# Patient Record
Sex: FEMALE | Race: WHITE | Employment: UNEMPLOYED | URBAN - METROPOLITAN AREA
[De-identification: names, ages, dates, MRNs, and addresses within clinical notes are randomized per-mention and may not be internally consistent; named-entity substitution may affect disease eponyms.]

---

## 2021-10-04 ENCOUNTER — APPOINTMENT (OUTPATIENT)
Dept: GENERAL RADIOLOGY | Age: 36
DRG: 137 | End: 2021-10-04
Attending: STUDENT IN AN ORGANIZED HEALTH CARE EDUCATION/TRAINING PROGRAM
Payer: MEDICAID

## 2021-10-04 ENCOUNTER — APPOINTMENT (OUTPATIENT)
Dept: CT IMAGING | Age: 36
DRG: 137 | End: 2021-10-04
Attending: STUDENT IN AN ORGANIZED HEALTH CARE EDUCATION/TRAINING PROGRAM
Payer: MEDICAID

## 2021-10-04 ENCOUNTER — HOSPITAL ENCOUNTER (INPATIENT)
Age: 36
LOS: 2 days | Discharge: HOME | DRG: 137 | End: 2021-10-06
Attending: STUDENT IN AN ORGANIZED HEALTH CARE EDUCATION/TRAINING PROGRAM | Admitting: INTERNAL MEDICINE
Payer: MEDICAID

## 2021-10-04 DIAGNOSIS — U07.1 COVID-19: Primary | ICD-10-CM

## 2021-10-04 DIAGNOSIS — R09.02 HYPOXIA: ICD-10-CM

## 2021-10-04 DIAGNOSIS — D72.819 LEUKOPENIA, UNSPECIFIED TYPE: ICD-10-CM

## 2021-10-04 LAB
ALBUMIN SERPL-MCNC: 3.4 G/DL (ref 3.5–5)
ALBUMIN/GLOB SERPL: 1.1 {RATIO} (ref 1.1–2.2)
ALP SERPL-CCNC: 73 U/L (ref 45–117)
ALT SERPL-CCNC: 45 U/L (ref 12–78)
ANION GAP SERPL CALC-SCNC: 8 MMOL/L (ref 5–15)
AST SERPL W P-5'-P-CCNC: 34 U/L (ref 15–37)
ATRIAL RATE: 123 BPM
BASOPHILS # BLD: 0 K/UL (ref 0–0.1)
BASOPHILS NFR BLD: 0 % (ref 0–1)
BILIRUB SERPL-MCNC: 0.3 MG/DL (ref 0.2–1)
BNP SERPL-MCNC: 31 PG/ML
BUN SERPL-MCNC: 9 MG/DL (ref 6–20)
BUN/CREAT SERPL: 13 (ref 12–20)
CA-I BLD-MCNC: 8.2 MG/DL (ref 8.5–10.1)
CALCULATED P AXIS, ECG09: 51 DEGREES
CALCULATED R AXIS, ECG10: 3 DEGREES
CALCULATED T AXIS, ECG11: -4 DEGREES
CHLORIDE SERPL-SCNC: 106 MMOL/L (ref 97–108)
CO2 SERPL-SCNC: 26 MMOL/L (ref 21–32)
COVID-19 RAPID TEST, COVR: DETECTED
CREAT SERPL-MCNC: 0.71 MG/DL (ref 0.55–1.02)
CRP SERPL-MCNC: 1.92 MG/DL (ref 0–0.6)
DIAGNOSIS, 93000: NORMAL
DIFFERENTIAL METHOD BLD: ABNORMAL
EOSINOPHIL # BLD: 0 K/UL (ref 0–0.4)
EOSINOPHIL NFR BLD: 0 % (ref 0–7)
ERYTHROCYTE [DISTWIDTH] IN BLOOD BY AUTOMATED COUNT: 13.6 % (ref 11.5–14.5)
FERRITIN SERPL-MCNC: 90 NG/ML (ref 8–252)
GLOBULIN SER CALC-MCNC: 3.1 G/DL (ref 2–4)
GLUCOSE SERPL-MCNC: 124 MG/DL (ref 65–100)
HCG SERPL QL: NEGATIVE
HCT VFR BLD AUTO: 38.6 % (ref 35–47)
HGB BLD-MCNC: 12.6 G/DL (ref 11.5–16)
IMM GRANULOCYTES # BLD AUTO: 0 K/UL (ref 0–0.04)
IMM GRANULOCYTES NFR BLD AUTO: 1 % (ref 0–0.5)
LACTATE SERPL-SCNC: 1.2 MMOL/L (ref 0.4–2)
LDH SERPL L TO P-CCNC: 273 U/L (ref 81–246)
LYMPHOCYTES # BLD: 0.6 K/UL (ref 0.8–3.5)
LYMPHOCYTES NFR BLD: 11 % (ref 12–49)
MCH RBC QN AUTO: 28.1 PG (ref 26–34)
MCHC RBC AUTO-ENTMCNC: 32.6 G/DL (ref 30–36.5)
MCV RBC AUTO: 86.2 FL (ref 80–99)
MONOCYTES # BLD: 0.3 K/UL (ref 0–1)
MONOCYTES NFR BLD: 5 % (ref 5–13)
NEUTS SEG # BLD: 4.9 K/UL (ref 1.8–8)
NEUTS SEG NFR BLD: 83 % (ref 32–75)
NRBC # BLD: 0 K/UL (ref 0–0.01)
NRBC BLD-RTO: 0 PER 100 WBC
P-R INTERVAL, ECG05: 132 MS
PLATELET # BLD AUTO: 294 K/UL (ref 150–400)
PMV BLD AUTO: 9.5 FL (ref 8.9–12.9)
POTASSIUM SERPL-SCNC: 3.6 MMOL/L (ref 3.5–5.1)
PROCALCITONIN SERPL-MCNC: <0.05 NG/ML
PROT SERPL-MCNC: 6.5 G/DL (ref 6.4–8.2)
Q-T INTERVAL, ECG07: 322 MS
QRS DURATION, ECG06: 76 MS
QTC CALCULATION (BEZET), ECG08: 460 MS
RBC # BLD AUTO: 4.48 M/UL (ref 3.8–5.2)
SODIUM SERPL-SCNC: 140 MMOL/L (ref 136–145)
SPECIMEN SOURCE: ABNORMAL
TROPONIN I SERPL-MCNC: <0.05 NG/ML
VENTRICULAR RATE, ECG03: 123 BPM
WBC # BLD AUTO: 5.8 K/UL (ref 3.6–11)

## 2021-10-04 PROCEDURE — 93005 ELECTROCARDIOGRAM TRACING: CPT

## 2021-10-04 PROCEDURE — 65270000029 HC RM PRIVATE

## 2021-10-04 PROCEDURE — 96374 THER/PROPH/DIAG INJ IV PUSH: CPT

## 2021-10-04 PROCEDURE — 71045 X-RAY EXAM CHEST 1 VIEW: CPT

## 2021-10-04 PROCEDURE — 74011000258 HC RX REV CODE- 258: Performed by: HOSPITALIST

## 2021-10-04 PROCEDURE — 85025 COMPLETE CBC W/AUTO DIFF WBC: CPT

## 2021-10-04 PROCEDURE — 74011000636 HC RX REV CODE- 636: Performed by: HOSPITALIST

## 2021-10-04 PROCEDURE — 83880 ASSAY OF NATRIURETIC PEPTIDE: CPT

## 2021-10-04 PROCEDURE — 96372 THER/PROPH/DIAG INJ SC/IM: CPT

## 2021-10-04 PROCEDURE — 99285 EMERGENCY DEPT VISIT HI MDM: CPT

## 2021-10-04 PROCEDURE — 71275 CT ANGIOGRAPHY CHEST: CPT

## 2021-10-04 PROCEDURE — 74011250637 HC RX REV CODE- 250/637: Performed by: INTERNAL MEDICINE

## 2021-10-04 PROCEDURE — 84703 CHORIONIC GONADOTROPIN ASSAY: CPT

## 2021-10-04 PROCEDURE — 84484 ASSAY OF TROPONIN QUANT: CPT

## 2021-10-04 PROCEDURE — XW0DXM6 INTRODUCTION OF BARICITINIB INTO MOUTH AND PHARYNX, EXTERNAL APPROACH, NEW TECHNOLOGY GROUP 6: ICD-10-PCS | Performed by: INTERNAL MEDICINE

## 2021-10-04 PROCEDURE — 99222 1ST HOSP IP/OBS MODERATE 55: CPT | Performed by: INTERNAL MEDICINE

## 2021-10-04 PROCEDURE — 74011250636 HC RX REV CODE- 250/636: Performed by: STUDENT IN AN ORGANIZED HEALTH CARE EDUCATION/TRAINING PROGRAM

## 2021-10-04 PROCEDURE — 74011000250 HC RX REV CODE- 250: Performed by: HOSPITALIST

## 2021-10-04 PROCEDURE — 83605 ASSAY OF LACTIC ACID: CPT

## 2021-10-04 PROCEDURE — 96375 TX/PRO/DX INJ NEW DRUG ADDON: CPT

## 2021-10-04 PROCEDURE — 82728 ASSAY OF FERRITIN: CPT

## 2021-10-04 PROCEDURE — 86140 C-REACTIVE PROTEIN: CPT

## 2021-10-04 PROCEDURE — 36415 COLL VENOUS BLD VENIPUNCTURE: CPT

## 2021-10-04 PROCEDURE — 74011250636 HC RX REV CODE- 250/636: Performed by: HOSPITALIST

## 2021-10-04 PROCEDURE — 83615 LACTATE (LD) (LDH) ENZYME: CPT

## 2021-10-04 PROCEDURE — 74011250637 HC RX REV CODE- 250/637: Performed by: HOSPITALIST

## 2021-10-04 PROCEDURE — 87635 SARS-COV-2 COVID-19 AMP PRB: CPT

## 2021-10-04 PROCEDURE — 84145 PROCALCITONIN (PCT): CPT

## 2021-10-04 PROCEDURE — 80053 COMPREHEN METABOLIC PANEL: CPT

## 2021-10-04 PROCEDURE — 74011250636 HC RX REV CODE- 250/636: Performed by: INTERNAL MEDICINE

## 2021-10-04 RX ORDER — LORAZEPAM 0.5 MG/1
0.5 TABLET ORAL
Status: CANCELLED | OUTPATIENT
Start: 2021-10-04

## 2021-10-04 RX ORDER — ONDANSETRON 4 MG/1
4 TABLET, ORALLY DISINTEGRATING ORAL
Status: DISCONTINUED | OUTPATIENT
Start: 2021-10-04 | End: 2021-10-06 | Stop reason: HOSPADM

## 2021-10-04 RX ORDER — DEXAMETHASONE SODIUM PHOSPHATE 4 MG/ML
6 INJECTION, SOLUTION INTRA-ARTICULAR; INTRALESIONAL; INTRAMUSCULAR; INTRAVENOUS; SOFT TISSUE EVERY 24 HOURS
Status: DISCONTINUED | OUTPATIENT
Start: 2021-10-04 | End: 2021-10-04

## 2021-10-04 RX ORDER — LORAZEPAM 0.5 MG/1
0.5 TABLET ORAL
COMMUNITY

## 2021-10-04 RX ORDER — ENOXAPARIN SODIUM 100 MG/ML
40 INJECTION SUBCUTANEOUS DAILY
Status: DISCONTINUED | OUTPATIENT
Start: 2021-10-04 | End: 2021-10-04

## 2021-10-04 RX ORDER — ENOXAPARIN SODIUM 100 MG/ML
30 INJECTION SUBCUTANEOUS EVERY 12 HOURS
Status: DISCONTINUED | OUTPATIENT
Start: 2021-10-04 | End: 2021-10-06 | Stop reason: HOSPADM

## 2021-10-04 RX ORDER — MAG HYDROX/ALUMINUM HYD/SIMETH 200-200-20
45 SUSPENSION, ORAL (FINAL DOSE FORM) ORAL
Status: COMPLETED | OUTPATIENT
Start: 2021-10-04 | End: 2021-10-04

## 2021-10-04 RX ORDER — MELATONIN
1000 DAILY
Status: DISCONTINUED | OUTPATIENT
Start: 2021-10-04 | End: 2021-10-06 | Stop reason: HOSPADM

## 2021-10-04 RX ORDER — SODIUM CHLORIDE 0.9 % (FLUSH) 0.9 %
5-40 SYRINGE (ML) INJECTION EVERY 8 HOURS
Status: DISCONTINUED | OUTPATIENT
Start: 2021-10-04 | End: 2021-10-06 | Stop reason: HOSPADM

## 2021-10-04 RX ORDER — DOXYCYCLINE 100 MG/1
100 CAPSULE ORAL 2 TIMES DAILY
COMMUNITY
Start: 2021-10-03 | End: 2021-10-06

## 2021-10-04 RX ORDER — IPRATROPIUM BROMIDE AND ALBUTEROL SULFATE 2.5; .5 MG/3ML; MG/3ML
3 SOLUTION RESPIRATORY (INHALATION)
Status: DISCONTINUED | OUTPATIENT
Start: 2021-10-04 | End: 2021-10-06 | Stop reason: HOSPADM

## 2021-10-04 RX ORDER — ONDANSETRON 2 MG/ML
4 INJECTION INTRAMUSCULAR; INTRAVENOUS
Status: DISCONTINUED | OUTPATIENT
Start: 2021-10-04 | End: 2021-10-06 | Stop reason: HOSPADM

## 2021-10-04 RX ORDER — PANTOPRAZOLE SODIUM 40 MG/1
40 TABLET, DELAYED RELEASE ORAL DAILY
Status: DISCONTINUED | OUTPATIENT
Start: 2021-10-04 | End: 2021-10-06 | Stop reason: HOSPADM

## 2021-10-04 RX ORDER — ACETAMINOPHEN 325 MG/1
650 TABLET ORAL
Status: DISCONTINUED | OUTPATIENT
Start: 2021-10-04 | End: 2021-10-06 | Stop reason: HOSPADM

## 2021-10-04 RX ORDER — CHOLECALCIFEROL (VITAMIN D3) 125 MCG
5 CAPSULE ORAL
Status: DISCONTINUED | OUTPATIENT
Start: 2021-10-04 | End: 2021-10-06 | Stop reason: HOSPADM

## 2021-10-04 RX ORDER — ZINC SULFATE 50(220)MG
1 CAPSULE ORAL DAILY
Status: DISCONTINUED | OUTPATIENT
Start: 2021-10-04 | End: 2021-10-06 | Stop reason: HOSPADM

## 2021-10-04 RX ORDER — DEXAMETHASONE SODIUM PHOSPHATE 4 MG/ML
6 INJECTION, SOLUTION INTRA-ARTICULAR; INTRALESIONAL; INTRAMUSCULAR; INTRAVENOUS; SOFT TISSUE EVERY 12 HOURS
Status: DISCONTINUED | OUTPATIENT
Start: 2021-10-04 | End: 2021-10-06 | Stop reason: HOSPADM

## 2021-10-04 RX ORDER — POLYETHYLENE GLYCOL 3350 17 G/17G
17 POWDER, FOR SOLUTION ORAL DAILY PRN
Status: DISCONTINUED | OUTPATIENT
Start: 2021-10-04 | End: 2021-10-06 | Stop reason: HOSPADM

## 2021-10-04 RX ORDER — ACETAMINOPHEN 650 MG/1
650 SUPPOSITORY RECTAL
Status: DISCONTINUED | OUTPATIENT
Start: 2021-10-04 | End: 2021-10-06 | Stop reason: HOSPADM

## 2021-10-04 RX ORDER — SODIUM CHLORIDE 0.9 % (FLUSH) 0.9 %
5-40 SYRINGE (ML) INJECTION AS NEEDED
Status: DISCONTINUED | OUTPATIENT
Start: 2021-10-04 | End: 2021-10-06 | Stop reason: HOSPADM

## 2021-10-04 RX ORDER — BENZONATATE 100 MG/1
100 CAPSULE ORAL
Status: DISCONTINUED | OUTPATIENT
Start: 2021-10-04 | End: 2021-10-06 | Stop reason: HOSPADM

## 2021-10-04 RX ORDER — BENZONATATE 100 MG/1
1 CAPSULE ORAL
COMMUNITY
Start: 2021-10-03

## 2021-10-04 RX ORDER — LORAZEPAM 0.5 MG/1
0.5 TABLET ORAL
Status: DISCONTINUED | OUTPATIENT
Start: 2021-10-04 | End: 2021-10-06 | Stop reason: HOSPADM

## 2021-10-04 RX ADMIN — DEXAMETHASONE SODIUM PHOSPHATE 6 MG: 4 INJECTION, SOLUTION INTRAMUSCULAR; INTRAVENOUS at 20:21

## 2021-10-04 RX ADMIN — ENOXAPARIN SODIUM 30 MG: 40 INJECTION SUBCUTANEOUS at 20:22

## 2021-10-04 RX ADMIN — ACETAMINOPHEN 650 MG: 325 TABLET ORAL at 20:21

## 2021-10-04 RX ADMIN — ZINC SULFATE 220 MG (50 MG) CAPSULE 1 CAPSULE: CAPSULE at 08:35

## 2021-10-04 RX ADMIN — IOPAMIDOL 100 ML: 755 INJECTION, SOLUTION INTRAVENOUS at 06:59

## 2021-10-04 RX ADMIN — LORAZEPAM 0.5 MG: 0.5 TABLET ORAL at 11:03

## 2021-10-04 RX ADMIN — BENZONATATE 100 MG: 100 CAPSULE ORAL at 20:21

## 2021-10-04 RX ADMIN — SODIUM CHLORIDE 1000 ML: 9 INJECTION, SOLUTION INTRAVENOUS at 05:41

## 2021-10-04 RX ADMIN — LORAZEPAM 0.5 MG: 0.5 TABLET ORAL at 23:17

## 2021-10-04 RX ADMIN — ENOXAPARIN SODIUM 40 MG: 40 INJECTION SUBCUTANEOUS at 08:36

## 2021-10-04 RX ADMIN — DEXAMETHASONE SODIUM PHOSPHATE 6 MG: 4 INJECTION, SOLUTION INTRA-ARTICULAR; INTRALESIONAL; INTRAMUSCULAR; INTRAVENOUS; SOFT TISSUE at 08:36

## 2021-10-04 RX ADMIN — DOXYCYCLINE 100 MG: 100 INJECTION, POWDER, LYOPHILIZED, FOR SOLUTION INTRAVENOUS at 08:36

## 2021-10-04 RX ADMIN — CEFTRIAXONE 1 G: 1 INJECTION, POWDER, FOR SOLUTION INTRAMUSCULAR; INTRAVENOUS at 08:35

## 2021-10-04 RX ADMIN — Medication 1000 UNITS: at 08:35

## 2021-10-04 RX ADMIN — Medication 10 ML: at 22:46

## 2021-10-04 RX ADMIN — PANTOPRAZOLE SODIUM 40 MG: 40 TABLET, DELAYED RELEASE ORAL at 15:40

## 2021-10-04 RX ADMIN — ALUMINUM HYDROXIDE, MAGNESIUM HYDROXIDE, AND SIMETHICONE 45 ML: 200; 200; 20 SUSPENSION ORAL at 13:40

## 2021-10-04 NOTE — ED NOTES
TRANSFER - OUT REPORT:    Verbal report given to Onofre(name) on Jamie Barnard  being transferred to (unit) for routine progression of care       Report consisted of patients Situation, Background, Assessment and   Recommendations(SBAR). Information from the following report(s) SBAR and ED Summary was reviewed with the receiving nurse. Lines:   Peripheral IV 10/04/21 Left;Posterior Forearm (Active)        Opportunity for questions and clarification was provided.       Patient transported with:   CH Mack

## 2021-10-04 NOTE — ED PROVIDER NOTES
EMERGENCY DEPARTMENT HISTORY AND PHYSICAL EXAM      Date: 10/4/2021  Patient Name: Rashawn Faith    History of Presenting Illness     Chief Complaint   Patient presents with    Chest Pain     covid +       HPI: Rashawn Faith, 30 y.o. female with no past medical history presenting for shortness of breath. The patient was diagnosed with COVID-19 11 days ago and has been on 2 days of doxycycline and 2 days of methylprednisone p.o. Yesterday she got the infusion of monoclonal antibodies. Today, she is presenting with worsening shortness of breath and chest tightness. She was hypoxic and was placed on 4 L of oxygen. She denies any chest pain. Denies any abdominal pain, nausea, or vomiting. No diarrhea. No runny nose or sore throat. No lightheadedness, dizziness, or syncope.       PCP: Unknown, Provider, MD    Current Facility-Administered Medications   Medication Dose Route Frequency Provider Last Rate Last Admin    sodium chloride 0.9 % bolus infusion 1,000 mL  1,000 mL IntraVENous Naman Sun MD 1,000 mL/hr at 10/04/21 0541 1,000 mL at 10/04/21 0541    sodium chloride (NS) flush 5-40 mL  5-40 mL IntraVENous Q8H Ellis Rachel MD        sodium chloride (NS) flush 5-40 mL  5-40 mL IntraVENous PRN Mateo Dooley MD        acetaminophen (TYLENOL) tablet 650 mg  650 mg Oral Q6H PRN Mateo Dooley MD        Or   Creston Sicard acetaminophen (TYLENOL) suppository 650 mg  650 mg Rectal Q6H PRN Mateo Dooley MD        polyethylene glycol (MIRALAX) packet 17 g  17 g Oral DAILY PRN Mateo Dooley MD        ondansetron (ZOFRAN ODT) tablet 4 mg  4 mg Oral Q8H PRN Mateo Dooley MD        Or    ondansetron [facility]) injection 4 mg  4 mg IntraVENous Q6H PRN Mateo Dooley MD        enoxaparin (LOVENOX) injection 40 mg  40 mg SubCUTAneous DAILY Mateo Dooley MD        dexamethasone (DECADRON) 4 mg/mL injection 6 mg  6 mg IntraVENous Q24H Mateo Dooley MD           Medical History   I reviewed the medical, surgical, family, and social history, as well as allergies:    Past Medical History:  Past Medical History:   Diagnosis Date    Hypertension     Other Ill-Defined Conditions     palpitations       Past Surgical History:  No past surgical history on file. Family History:  No family history on file. Social History:  Social History     Tobacco Use    Smoking status: Former Smoker   Substance Use Topics    Alcohol use: No    Drug use: No       Allergies: Allergies   Allergen Reactions    Codeine Nausea Only and Palpitations    Demerol [Meperidine] Itching    Zithromax [Azithromycin] Unknown (comments)       Review of Systems     Review of Systems   Constitutional: Positive for chills, fatigue and fever. HENT: Negative for congestion, rhinorrhea and sore throat. Eyes: Negative. Respiratory: Positive for cough and shortness of breath. Cardiovascular: Negative for chest pain and leg swelling. Gastrointestinal: Negative for abdominal pain and vomiting. Endocrine: Negative. Genitourinary: Negative for dysuria and hematuria. Musculoskeletal: Negative for back pain and myalgias. Skin: Negative for rash and wound. Allergic/Immunologic: Negative. Neurological: Negative for light-headedness and numbness. Hematological: Negative. Psychiatric/Behavioral: Negative for agitation and confusion. Physical Exam and Vital Signs   Vital Signs - Reviewed the patient's vital signs.     Patient Vitals for the past 12 hrs:   Temp Pulse Resp BP SpO2   10/04/21 0548 -- -- -- -- 94 %   10/04/21 0454 99.9 °F (37.7 °C) (!) 121 15 110/74 96 %       Physical Exam:    GENERAL: awake, alert, cooperative, not in distress  HEENT:  * Pupils equal, EOMI  * Head atraumatic  CV:  * regular rhythm  * warm and perfused extremities bilaterally  PULMONARY: Good air movement, no wheezes or crackles  ABDOMEN: soft, not distended, no guarding, not tenderness to palpation  : No suprapubic tenderness  EXTREMITIES/BACK: warm and perfused, no tenderness, no edema  SKIN: no rashes or signs of trauma  NEURO:  * Speech clear  * Moves U&LE to command      Medical Decision Making and ED Course   - I am the first and primary provider for this patient and am the primary provider of record. - I reviewed the vital signs, available nursing notes, past medical history, past surgical history, family history and social history. - Initial assessment performed. The patients presenting problems have been discussed, and the staff are in agreement with the care plan formulated and outlined with them. I have encouraged them to ask questions as they arise throughout their visit. - Available medical records, nursing notes, old EKGs, and EMS run sheets (if patient was EMS transported) were reviewed    MDM:   Patient is a 30 y.o. female presenting for COVID-19 like symptoms. Vitals reveal hypoxia and tachycardia to 121 and physical exam reveals no abnormalities. Based on the history, physical exam, risk factors, and vitals signs, differential includes: URI, COVID19, pneumonia, pneumothorax, postnasal drip, dehydration, electrolyte disturbances, DEMETRIA, PE. Results     Labs:     Recent Results (from the past 12 hour(s))   CBC WITH AUTOMATED DIFF    Collection Time: 10/04/21  5:35 AM   Result Value Ref Range    WBC 5.8 3.6 - 11.0 K/uL    RBC 4.48 3.80 - 5.20 M/uL    HGB 12.6 11.5 - 16.0 g/dL    HCT 38.6 35.0 - 47.0 %    MCV 86.2 80.0 - 99.0 FL    MCH 28.1 26.0 - 34.0 PG    MCHC 32.6 30.0 - 36.5 g/dL    RDW 13.6 11.5 - 14.5 %    PLATELET 429 264 - 412 K/uL    MPV 9.5 8.9 - 12.9 FL    NRBC 0.0 0.0  WBC    ABSOLUTE NRBC 0.00 0.00 - 0.01 K/uL    NEUTROPHILS 83 (H) 32 - 75 %    LYMPHOCYTES 11 (L) 12 - 49 %    MONOCYTES 5 5 - 13 %    EOSINOPHILS 0 0 - 7 %    BASOPHILS 0 0 - 1 %    IMMATURE GRANULOCYTES 1 (H) 0 - 0.5 %    ABS. NEUTROPHILS 4.9 1.8 - 8.0 K/UL    ABS. LYMPHOCYTES 0.6 (L) 0.8 - 3.5 K/UL    ABS.  MONOCYTES 0.3 0.0 - 1.0 K/UL    ABS. EOSINOPHILS 0.0 0.0 - 0.4 K/UL    ABS. BASOPHILS 0.0 0.0 - 0.1 K/UL    ABS. IMM. GRANS. 0.0 0.00 - 0.04 K/UL    DF AUTOMATED         Radiologic Studies:  CT Results  (Last 48 hours)    None        CXR Results  (Last 48 hours)    None          Medications ordered:  Medications   sodium chloride 0.9 % bolus infusion 1,000 mL (1,000 mL IntraVENous New Bag 10/4/21 0541)   sodium chloride (NS) flush 5-40 mL (has no administration in time range)   sodium chloride (NS) flush 5-40 mL (has no administration in time range)   acetaminophen (TYLENOL) tablet 650 mg (has no administration in time range)     Or   acetaminophen (TYLENOL) suppository 650 mg (has no administration in time range)   polyethylene glycol (MIRALAX) packet 17 g (has no administration in time range)   ondansetron (ZOFRAN ODT) tablet 4 mg (has no administration in time range)     Or   ondansetron (ZOFRAN) injection 4 mg (has no administration in time range)   enoxaparin (LOVENOX) injection 40 mg (has no administration in time range)   dexamethasone (DECADRON) 4 mg/mL injection 6 mg (has no administration in time range)        ED Course     ED Course:          Reassessment / Disposition / Discussion:    Patient stable. No acute complaints. Will admit. Final Disposition     Disposition: Condition stable  Admitted to Floor Medical Floor the case was discussed with the admitting physician Dr. Jackeline Brothers. ADMISSION: After completion of ED workup and discussion of results and diagnoses with the patient, patient was admitted to the hospital. All the patient's questions were answered. Case was discussed with the receiving team.    ED Procedures & Consultations   Performed by: Kelsie Morgan MD  Procedures     EKG interpretation (Preliminary):  Rhythm: normal sinus rhythm; and tachycardic . Rate (approx.): 123.   Axis: normal;  SD interval: normal;  QRS interval: normal ;  ST/T wave: normal;  Other findings: abnormal EKG: Sinus tachycardia. ED Critical Care   and Critical Care  CRITICAL CARE NOTE :  5:36 AM    Critical care time is being documented due to the fulfillment of at least one of the following:    - Critical conditions: condition that acutely impairs one or more vital organ systems such that there is a high probability of imminent or life-threatening deterioration in condition. Examples are diagnoses including but not limited to Afib RVR, DKA, PE, Etc. .    - Critical interventions: an action whose failure to initiate would likely allow a sudden, clinically significant decline in the patient's condition. These include   Requirement of transfer or ICU admission   Contemplation or provision of tPA   Drip initiation (pressors, antiarrhythmics, heparin, etc.)   Antidotes given (narcan, charcoal, epi for anaphylaxis, etc..)   >=2L fluid bolus   >=3 Duonebs   >1 IV/IM doses of sedatives, antiepileptics, BP meds, rate control meds, adenosine.  Procedures that are suggestive of critical care: chest tubes, cardioversion, BiPAP, IO, etc..    Critical care time is documented based on continuous or non-continuous provision of care that includes face-to-face time, placing orders, chart review, documentation, discussion with consultants, discussion with family. This time calculation is a best approximation and does not include time spent on CPR, EKG interpretation, central line placement, intubation, laceration repairs, and other separately billed procedures. Amount of Critical Care Time: 35minutes    Details of critical care provision is documented above.  A general summary is listed below:    IMPENDING DETERIORATION -Respiratory  ASSOCIATED RISK FACTORS - Hypoxia  MANAGEMENT- Bedside Assessment  INTERPRETATION -  Xrays and ECG  INTERVENTIONS - hemodynamic mngmt  CASE REVIEW - Hospitalist/Intensivist  TREATMENT RESPONSE -Stable  PERFORMED BY - Self    NOTES   :  During this entire length of time I was immediately available to the patient . Gerard Thomas MD    Diagnosis     Clinical Impression:   1. COVID-19    2. Hypoxia        Attestations:    Gerard Thomas MD    Please note that this dictation was completed with eDealya, the computer voice recognition software. Quite often unanticipated grammatical, syntax, homophones, and other interpretive errors are inadvertently transcribed by the computer software. Please disregard these errors. Please excuse any errors that have escaped final proofreading. Thank you.

## 2021-10-04 NOTE — CONSULTS
Infectious Disease Consult Note    Reason for Consult:  COVID PNA   Date of Consultation: October 4, 2021  Date of Admission: 10/4/2021  Referring Physician: Hospitalist     HPI: 35-y.o WF, who presented w a 1 day h/o dyspnea, fever, and chest pain after testing positive for COVID-19 11 days ago. She noted worsening dyspnea couple days ago, was seen at an outside hospital ED DR QUE GARRETT [facility]), given an infusion of monoclonal ab (Regeneron), doxycycline and steroids prior to d/c. She notes worsening dyspnea, non-productive cough subjective fever/chills after her visit to the ED which prompted her current  presentation. She had a temp of  99.9F earlier today is otherwise tachycardic, and maintaining O2 sats on 2 L. CTA chest done earlier today was negative for PE, revealed multifocal infiltrates consistent with COVID PNA. Amos Contreras Pt is currently on Decadron IV every 12 hours and baricitinib. She reported feeling better during my assessment. Her medical history is sig for obesity and HTN. Review of Systems:     Gen: Negative for chills, fevers, weight loss, weight gain   CV:  Negative for chest pain, dyspnea on exertion, leg edema   Lungs: shortness of breath, cough, wheezing   Abdomen: Negative for abdominal pain, nausea, vomiting, diarrhea, constipation   Genitourinary: Negative for genital pain or genital discharge     Neuro: Negative for headache, numbness, tingling, extremity weakness   Skin: Negative for rash, sores/open wounds   Musculoskeletal: Negative for joint pain, joint swelling, joint erythema    Psych: Negative for manic behavior       Past Medical History:  Past Medical History:   Diagnosis Date    Hypertension     Other Ill-Defined Conditions     palpitations       Past surgical history   No past surgical history on file.      Social History   Social History     Tobacco Use    Smoking status: Former Smoker   Substance Use Topics    Alcohol use: No    Drug use: No        Family history   No family history on file. Allergies: Allergies   Allergen Reactions    Codeine Nausea Only and Palpitations    Demerol [Meperidine] Itching    Zithromax [Azithromycin] Unknown (comments)     Patient said it makes \"her skin on fire\" per Aris Menchaca RN         Medications:  No current facility-administered medications on file prior to encounter. Current Outpatient Medications on File Prior to Encounter   Medication Sig Dispense Refill    LORazepam (ATIVAN) 0.5 mg tablet Take 0.5 mg by mouth two (2) times daily as needed. Physical Exam:    Vitals:   Patient Vitals for the past 24 hrs:   Temp Pulse Resp BP SpO2   10/04/21 1104 -- -- -- -- 97 %   10/04/21 1034 -- (!) 101 16 139/84 97 %   10/04/21 1033 -- -- -- -- 96 %   10/04/21 1000 -- (!) 102 16 -- --   10/04/21 0930 -- 99 15 139/84 97 %   10/04/21 0800 -- 87 22 (!) 144/79 96 %   10/04/21 0548 -- -- -- -- 94 %   10/04/21 0454 99.9 °F (37.7 °C) (!) 121 15 110/74 96 %   ·   · GEN: NAD, AAO x 4  · HEENT: NCAT, PERRLA  · HEART: S1, S2+, RRR, No murmur   · Lungs: Decreased at the bases, no wheeze/rhonchi   · Abdomen: soft, ND, NT, +BS   · Genitourinary: no genital discharge, no lange  · Extremities: no edema  · Skin: no rash    Labs:   Recent Labs     10/04/21  0535   WBC 5.8   HGB 12.6   HCT 38.6        Recent Labs     10/04/21  0535   BUN 9   CREA 0.71       Lab Results   Component Value Date/Time    C-Reactive protein 1.92 (H) 10/04/2021 11:20 AM        Microbiology Data: None      Imaging:   CTA chest 10/04: No pulmonary embolism definitively identified. Multifocal airspace  opacities in the lungs compatible with Covid-19 pneumonia. Other findings as  above. Assessment / Plan:     1.   Covid pneumonitis, moderate disease, symptoms commenced 11 days ago        S/p monoclonal Ab infusion 2 days ago (Regeneron)        Dyspneic on presentation, nonproductive cough, maintain O2 sats on 2 L       CT chest negative for PE, revealed b/l multifocal airspace disease        Afebrile, normal WBC, , CRP 1.92, Ferritin is pending        D/c Baricitinib, since pt is s/p Regeneron which has a long half life, missed window for Remdesivir        Continue supportive care and decadron. Currently no indication for antimicrobial therapy     2. Non -productive cough: due to # 1, symptomatic mgt     3. Obesity: Likely complicating # 1    4.  H/o HTN: mgt per primary       Peggy Larson MD     10/4/2021

## 2021-10-04 NOTE — H&P
History and Physical    Subjective:   Chief Complaint : chest pain and shortness of breath since 1 day, worsening  Source of information : patient     History of present illness:     35F, no PMH, with worsening shortness of breath and chest pain since 1 day    Diagnosed with COVID 11 days back, strated as cough and sorethorat with bodyaches, she was treated with symptomatically, 2 days back she developed worsening shortness of breath and was not able to walk without getting short of breath     She went to peripheral hospital received doxycycline, soulmedrol and monoclonal antibiodies    Her symptoms continued to worsen and presented here    Her CT chest and XR is pending as well as BMP    She probably got it from brother-in law who has covid as well, and eventually all her as well    ER: 4L NC    No PMH,     Past Medical History:   Diagnosis Date    Hypertension     Other Ill-Defined Conditions     palpitations     No past surgical history on file. No family history on file. Social History     Tobacco Use    Smoking status: Former Smoker   Substance Use Topics    Alcohol use: No       Prior to Admission medications    Not on File     Allergies   Allergen Reactions    Codeine Nausea Only and Palpitations    Demerol [Meperidine] Itching    Zithromax [Azithromycin] Unknown (comments)             Review of Systems:  Review of Systems   Constitutional: Positive for chills, fatigue and fever. HENT: Negative for congestion, rhinorrhea and sore throat. Eyes: Negative. Respiratory: Positive for cough and shortness of breath. Cardiovascular: Negative for chest pain and leg swelling. Gastrointestinal: Negative for abdominal pain and vomiting. Endocrine: Negative. Genitourinary: Negative for dysuria and hematuria. Musculoskeletal: Negative for back pain and myalgias. Skin: Negative for rash and wound. Allergic/Immunologic: Negative.     Neurological: Negative for light-headedness and numbness. Hematological: Negative. Psychiatric/Behavioral: Negative for agitation and confusion. Vitals:     Visit Vitals  /74   Pulse (!) 121   Temp 99.9 °F (37.7 °C)   Resp 15   Ht 5' 8\" (1.727 m)   Wt 113.4 kg (250 lb)   SpO2 94%   BMI 38.01 kg/m²       Physical Exam:   GENERAL: awake, alert, cooperative, not in distress  HEENT:  * Pupils equal, EOMI  * Head atraumatic  CV:  * regular rhythm  * warm and perfused extremities bilaterally  PULMONARY: Good air movement, no wheezes or crackles  ABDOMEN: soft, not distended, no guarding, not tenderness to palpation  : No suprapubic tenderness  EXTREMITIES/BACK: warm and perfused, no tenderness, no edema  SKIN: no rashes or signs of trauma  NEURO:  * Speech clear  * Moves U&LE to command         Data Review:   Recent Results (from the past 24 hour(s))   CBC WITH AUTOMATED DIFF    Collection Time: 10/04/21  5:35 AM   Result Value Ref Range    WBC 5.8 3.6 - 11.0 K/uL    RBC 4.48 3.80 - 5.20 M/uL    HGB 12.6 11.5 - 16.0 g/dL    HCT 38.6 35.0 - 47.0 %    MCV 86.2 80.0 - 99.0 FL    MCH 28.1 26.0 - 34.0 PG    MCHC 32.6 30.0 - 36.5 g/dL    RDW 13.6 11.5 - 14.5 %    PLATELET 075 934 - 405 K/uL    MPV 9.5 8.9 - 12.9 FL    NRBC 0.0 0.0  WBC    ABSOLUTE NRBC 0.00 0.00 - 0.01 K/uL    NEUTROPHILS 83 (H) 32 - 75 %    LYMPHOCYTES 11 (L) 12 - 49 %    MONOCYTES 5 5 - 13 %    EOSINOPHILS 0 0 - 7 %    BASOPHILS 0 0 - 1 %    IMMATURE GRANULOCYTES 1 (H) 0 - 0.5 %    ABS. NEUTROPHILS 4.9 1.8 - 8.0 K/UL    ABS. LYMPHOCYTES 0.6 (L) 0.8 - 3.5 K/UL    ABS. MONOCYTES 0.3 0.0 - 1.0 K/UL    ABS. EOSINOPHILS 0.0 0.0 - 0.4 K/UL    ABS. BASOPHILS 0.0 0.0 - 0.1 K/UL    ABS. IMM.  GRANS. 0.0 0.00 - 0.04 K/UL    DF AUTOMATED               Assessment and Plan :     (1) Acute hypoxic respiratory failure : 4L NC, wean as tolerated to keep saturation > 92    (2) covid-19 pneumonia: decadron, azithromycin, ceftriaone, consult ID    (3) morbid obesity: complicates all aspects of care    DVT ppx: lovenox     DISPO: pending ct chest, XR, BMP.  Once on room air, 3-4 days    Signed By: Natalie Barrera MD     October 4, 2021

## 2021-10-04 NOTE — PROGRESS NOTES
Hospitalist Progress Note               Daily Progress Note: 10/4/2021      Subjective:   Hospital course to date: Patient is a 71-year-old female without past medical history who was began with symptoms of cough and shortness of breath 11 days ago, diagnosed with COVID-19 7 days ago. She was doing well at home until the past 2 days when she developed worsening shortness of breath. Patient was recently seen at another facility, received doxycycline, steroids and treatment with monoclonal antibodies yesterday at a different hospital.  Despite that, her condition still worsened. In the ED she was hypoxic and required 4 L nasal cannula. Heart rate was 120. CT of the chest was compatible with COVID-19    She was admitted and started on ceftriaxone and doxycycline    ----    Patient seen today for follow-up. She continues on 4 L nasal cannula.   Procalcitonin is negative    Problem List:      Medications reviewed  Current Facility-Administered Medications   Medication Dose Route Frequency    sodium chloride (NS) flush 5-40 mL  5-40 mL IntraVENous Q8H    sodium chloride (NS) flush 5-40 mL  5-40 mL IntraVENous PRN    acetaminophen (TYLENOL) tablet 650 mg  650 mg Oral Q6H PRN    Or    acetaminophen (TYLENOL) suppository 650 mg  650 mg Rectal Q6H PRN    polyethylene glycol (MIRALAX) packet 17 g  17 g Oral DAILY PRN    ondansetron (ZOFRAN ODT) tablet 4 mg  4 mg Oral Q8H PRN    Or    ondansetron (ZOFRAN) injection 4 mg  4 mg IntraVENous Q6H PRN    enoxaparin (LOVENOX) injection 40 mg  40 mg SubCUTAneous DAILY    dexamethasone (DECADRON) 4 mg/mL injection 6 mg  6 mg IntraVENous Q24H    cefTRIAXone (ROCEPHIN) 1 g in sterile water (preservative free) 10 mL IV syringe  1 g IntraVENous Q24H    zinc sulfate (ZINCATE) 50 mg zinc (220 mg) capsule 1 Capsule  1 Capsule Oral DAILY    cholecalciferol (VITAMIN D3) (1000 Units /25 mcg) tablet 1,000 Units  1,000 Units Oral DAILY    benzonatate (TESSALON) capsule 100 mg  100 mg Oral TID PRN    albuterol-ipratropium (DUO-NEB) 2.5 MG-0.5 MG/3 ML  3 mL Nebulization Q4H PRN    LORazepam (ATIVAN) tablet 0.5 mg  0.5 mg Oral Q4H PRN    melatonin tablet 5 mg  5 mg Oral QHS PRN    doxycycline (VIBRAMYCIN) 100 mg in 0.9% sodium chloride (MBP/ADV) 100 mL MBP  100 mg IntraVENous Q12H     Current Outpatient Medications   Medication Sig    LORazepam (ATIVAN) 0.5 mg tablet Take 0.5 mg by mouth two (2) times daily as needed. Review of Systems:   A comprehensive review of systems was negative except for that written in the HPI. Objective:   Physical Exam:     Visit Vitals  /74   Pulse (!) 121   Temp 99.9 °F (37.7 °C)   Resp 15   Ht 5' 8\" (1.727 m)   Wt 113.4 kg (250 lb)   SpO2 94%   BMI 38.01 kg/m²    O2 Flow Rate (L/min): 4 l/min O2 Device: None (Room air)    Temp (24hrs), Av.9 °F (37.7 °C), Min:99.9 °F (37.7 °C), Max:99.9 °F (37.7 °C)    No intake/output data recorded. No intake/output data recorded. General:   Awake and alert   Lungs:   Clear to auscultation bilaterally. Chest wall:  No tenderness or deformity. Heart:  Regular rate and rhythm, S1, S2 normal, no murmur, click, rub or gallop. Abdomen:   Soft, non-tender. Bowel sounds normal. No masses,  No organomegaly. Extremities: Extremities normal, atraumatic, no cyanosis or edema. Pulses: 2+ and symmetric all extremities. Skin: Skin color, texture, turgor normal. No rashes or lesions   Neurologic: CNII-XII intact. No gross focal deficits         Data Review:       Recent Days:  Recent Labs     10/04/21  0535   WBC 5.8   HGB 12.6   HCT 38.6        Recent Labs     10/04/21  0535      K 3.6      CO2 26   *   BUN 9   CREA 0.71   CA 8.2*   ALB 3.4*   TBILI 0.3   ALT 45     No results for input(s): PH, PCO2, PO2, HCO3, FIO2 in the last 72 hours.     24 Hour Results:  Recent Results (from the past 24 hour(s))   EKG, 12 LEAD, INITIAL    Collection Time: 10/04/21  5:01 AM Result Value Ref Range    Ventricular Rate 123 BPM    Atrial Rate 123 BPM    P-R Interval 132 ms    QRS Duration 76 ms    Q-T Interval 322 ms    QTC Calculation (Bezet) 460 ms    Calculated P Axis 51 degrees    Calculated R Axis 3 degrees    Calculated T Axis -4 degrees    Diagnosis       Sinus tachycardia  Cannot rule out Anterior infarct , age undetermined  Abnormal ECG  No previous ECGs available  Confirmed by Jeanmarie Salazar ([id number]) on 10/4/2021 8:10:08 AM     CBC WITH AUTOMATED DIFF    Collection Time: 10/04/21  5:35 AM   Result Value Ref Range    WBC 5.8 3.6 - 11.0 K/uL    RBC 4.48 3.80 - 5.20 M/uL    HGB 12.6 11.5 - 16.0 g/dL    HCT 38.6 35.0 - 47.0 %    MCV 86.2 80.0 - 99.0 FL    MCH 28.1 26.0 - 34.0 PG    MCHC 32.6 30.0 - 36.5 g/dL    RDW 13.6 11.5 - 14.5 %    PLATELET 977 833 - 214 K/uL    MPV 9.5 8.9 - 12.9 FL    NRBC 0.0 0.0  WBC    ABSOLUTE NRBC 0.00 0.00 - 0.01 K/uL    NEUTROPHILS 83 (H) 32 - 75 %    LYMPHOCYTES 11 (L) 12 - 49 %    MONOCYTES 5 5 - 13 %    EOSINOPHILS 0 0 - 7 %    BASOPHILS 0 0 - 1 %    IMMATURE GRANULOCYTES 1 (H) 0 - 0.5 %    ABS. NEUTROPHILS 4.9 1.8 - 8.0 K/UL    ABS. LYMPHOCYTES 0.6 (L) 0.8 - 3.5 K/UL    ABS. MONOCYTES 0.3 0.0 - 1.0 K/UL    ABS. EOSINOPHILS 0.0 0.0 - 0.4 K/UL    ABS. BASOPHILS 0.0 0.0 - 0.1 K/UL    ABS. IMM. GRANS. 0.0 0.00 - 0.04 K/UL    DF AUTOMATED     METABOLIC PANEL, COMPREHENSIVE    Collection Time: 10/04/21  5:35 AM   Result Value Ref Range    Sodium 140 136 - 145 mmol/L    Potassium 3.6 3.5 - 5.1 mmol/L    Chloride 106 97 - 108 mmol/L    CO2 26 21 - 32 mmol/L    Anion gap 8 5 - 15 mmol/L    Glucose 124 (H) 65 - 100 mg/dL    BUN 9 6 - 20 mg/dL    Creatinine 0.71 0.55 - 1.02 mg/dL    BUN/Creatinine ratio 13 12 - 20      GFR est AA >60 >60 ml/min/1.73m2    GFR est non-AA >60 >60 ml/min/1.73m2    Calcium 8.2 (L) 8.5 - 10.1 mg/dL    Bilirubin, total 0.3 0.2 - 1.0 mg/dL    AST (SGOT) 34 15 - 37 U/L    ALT (SGPT) 45 12 - 78 U/L    Alk.  phosphatase 73 45 - 117 U/L    Protein, total 6.5 6.4 - 8.2 g/dL    Albumin 3.4 (L) 3.5 - 5.0 g/dL    Globulin 3.1 2.0 - 4.0 g/dL    A-G Ratio 1.1 1.1 - 2.2     TROPONIN I    Collection Time: 10/04/21  5:35 AM   Result Value Ref Range    Troponin-I, Qt. <0.05 <0.05 ng/mL   BNP    Collection Time: 10/04/21  5:35 AM   Result Value Ref Range    NT pro-BNP 31 <125 pg/mL   HCG QL SERUM    Collection Time: 10/04/21  5:35 AM   Result Value Ref Range    HCG, Ql. Negative Negative     LACTIC ACID    Collection Time: 10/04/21  5:35 AM   Result Value Ref Range    Lactic acid 1.2 0.4 - 2.0 mmol/L   PROCALCITONIN    Collection Time: 10/04/21  5:35 AM   Result Value Ref Range    Procalcitonin <0.05 (H) 0 ng/mL       CTA CHEST W OR W WO CONT   Final Result   No pulmonary embolism definitively identified. Multifocal airspace   opacities in the lungs compatible with Covid-19 pneumonia. Other findings as   above. XR CHEST SNGL V   Final Result   Bilateral multifocal infiltrates. Pattern is typical of Covid 19   pneumonia. Other etiologies might also be considered. .                     Assessment:  COVID-19 with pneumonitis, nonsevere disease   -Status post outpatient monoclonal antibody treatment 10/3    Acute respiratory failure with hypoxia    Anxiety. Home dose of Ativan reordered    Plan:  Continue Decadron-  increase twice daily  Discontinue antibiotics in lieu of normal procalcitonin  Start  baricitinib  Patient not a candidate for Remdesivir given duration of symptoms  Check baseline CRP, LDH and ferritin  Increase Lovenox 30 mg subcutaneous every 12 hours      Care Plan discussed with: Patient/Family    Disposition: Continued inpatient stay 48+ hours    Total time spent with patient: 30 minutes.     Gómez Hargrove MD

## 2021-10-05 LAB
ANION GAP SERPL CALC-SCNC: 6 MMOL/L (ref 5–15)
ATRIAL RATE: 78 BPM
BASOPHILS # BLD: 0 K/UL (ref 0–0.1)
BASOPHILS NFR BLD: 0 % (ref 0–1)
BUN SERPL-MCNC: 9 MG/DL (ref 6–20)
BUN/CREAT SERPL: 17 (ref 12–20)
CA-I BLD-MCNC: 8.4 MG/DL (ref 8.5–10.1)
CALCULATED P AXIS, ECG09: 10 DEGREES
CALCULATED R AXIS, ECG10: 1 DEGREES
CALCULATED T AXIS, ECG11: 2 DEGREES
CHLORIDE SERPL-SCNC: 109 MMOL/L (ref 97–108)
CO2 SERPL-SCNC: 26 MMOL/L (ref 21–32)
CREAT SERPL-MCNC: 0.53 MG/DL (ref 0.55–1.02)
CRP SERPL HS-MCNC: >9.5 MG/L
DIAGNOSIS, 93000: NORMAL
DIFFERENTIAL METHOD BLD: ABNORMAL
EOSINOPHIL # BLD: 0 K/UL (ref 0–0.4)
EOSINOPHIL NFR BLD: 0 % (ref 0–7)
ERYTHROCYTE [DISTWIDTH] IN BLOOD BY AUTOMATED COUNT: 13.8 % (ref 11.5–14.5)
GLUCOSE SERPL-MCNC: 140 MG/DL (ref 65–100)
HCT VFR BLD AUTO: 38.3 % (ref 35–47)
HGB BLD-MCNC: 12.3 G/DL (ref 11.5–16)
IMM GRANULOCYTES # BLD AUTO: 0 K/UL
IMM GRANULOCYTES NFR BLD AUTO: 0 %
LYMPHOCYTES # BLD: 0.7 K/UL (ref 0.8–3.5)
LYMPHOCYTES NFR BLD: 31 % (ref 12–49)
MCH RBC QN AUTO: 28.1 PG (ref 26–34)
MCHC RBC AUTO-ENTMCNC: 32.1 G/DL (ref 30–36.5)
MCV RBC AUTO: 87.6 FL (ref 80–99)
MONOCYTES # BLD: 0.2 K/UL (ref 0–1)
MONOCYTES NFR BLD: 7 % (ref 5–13)
NEUTS SEG # BLD: 1.3 K/UL (ref 1.8–8)
NEUTS SEG NFR BLD: 62 % (ref 32–75)
NRBC # BLD: 0 K/UL (ref 0–0.01)
NRBC BLD-RTO: 0 PER 100 WBC
P-R INTERVAL, ECG05: 140 MS
PLATELET # BLD AUTO: 317 K/UL (ref 150–400)
PMV BLD AUTO: 9.6 FL (ref 8.9–12.9)
POTASSIUM SERPL-SCNC: 4 MMOL/L (ref 3.5–5.1)
PROCALCITONIN SERPL-MCNC: <0.05 NG/ML
Q-T INTERVAL, ECG07: 406 MS
QRS DURATION, ECG06: 82 MS
QTC CALCULATION (BEZET), ECG08: 462 MS
RBC # BLD AUTO: 4.37 M/UL (ref 3.8–5.2)
RBC MORPH BLD: ABNORMAL
SODIUM SERPL-SCNC: 141 MMOL/L (ref 136–145)
TROPONIN I SERPL-MCNC: <0.05 NG/ML
VENTRICULAR RATE, ECG03: 78 BPM
WBC # BLD AUTO: 2.2 K/UL (ref 3.6–11)

## 2021-10-05 PROCEDURE — 93005 ELECTROCARDIOGRAM TRACING: CPT

## 2021-10-05 PROCEDURE — 80048 BASIC METABOLIC PNL TOTAL CA: CPT

## 2021-10-05 PROCEDURE — 74011250637 HC RX REV CODE- 250/637: Performed by: INTERNAL MEDICINE

## 2021-10-05 PROCEDURE — 65270000029 HC RM PRIVATE

## 2021-10-05 PROCEDURE — 99232 SBSQ HOSP IP/OBS MODERATE 35: CPT | Performed by: INTERNAL MEDICINE

## 2021-10-05 PROCEDURE — 85025 COMPLETE CBC W/AUTO DIFF WBC: CPT

## 2021-10-05 PROCEDURE — 84145 PROCALCITONIN (PCT): CPT

## 2021-10-05 PROCEDURE — 84484 ASSAY OF TROPONIN QUANT: CPT

## 2021-10-05 PROCEDURE — 74011250636 HC RX REV CODE- 250/636: Performed by: INTERNAL MEDICINE

## 2021-10-05 PROCEDURE — 86141 C-REACTIVE PROTEIN HS: CPT

## 2021-10-05 PROCEDURE — 36415 COLL VENOUS BLD VENIPUNCTURE: CPT

## 2021-10-05 PROCEDURE — 74011250637 HC RX REV CODE- 250/637: Performed by: HOSPITALIST

## 2021-10-05 PROCEDURE — 77010033678 HC OXYGEN DAILY

## 2021-10-05 PROCEDURE — 94760 N-INVAS EAR/PLS OXIMETRY 1: CPT

## 2021-10-05 RX ORDER — DEXTROMETHORPHAN POLISTIREX 30 MG/5ML
30 SUSPENSION ORAL EVERY 12 HOURS
Status: DISCONTINUED | OUTPATIENT
Start: 2021-10-05 | End: 2021-10-06 | Stop reason: HOSPADM

## 2021-10-05 RX ADMIN — PANTOPRAZOLE SODIUM 40 MG: 40 TABLET, DELAYED RELEASE ORAL at 09:00

## 2021-10-05 RX ADMIN — ENOXAPARIN SODIUM 30 MG: 40 INJECTION SUBCUTANEOUS at 09:00

## 2021-10-05 RX ADMIN — ZINC SULFATE 220 MG (50 MG) CAPSULE 1 CAPSULE: CAPSULE at 09:00

## 2021-10-05 RX ADMIN — LORAZEPAM 0.5 MG: 0.5 TABLET ORAL at 20:44

## 2021-10-05 RX ADMIN — DEXAMETHASONE SODIUM PHOSPHATE 6 MG: 4 INJECTION, SOLUTION INTRAMUSCULAR; INTRAVENOUS at 10:48

## 2021-10-05 RX ADMIN — Medication 10 ML: at 22:39

## 2021-10-05 RX ADMIN — DEXAMETHASONE SODIUM PHOSPHATE 6 MG: 4 INJECTION, SOLUTION INTRAMUSCULAR; INTRAVENOUS at 20:31

## 2021-10-05 RX ADMIN — ENOXAPARIN SODIUM 30 MG: 40 INJECTION SUBCUTANEOUS at 20:31

## 2021-10-05 RX ADMIN — Medication 1000 UNITS: at 09:00

## 2021-10-05 RX ADMIN — Medication 10 ML: at 06:26

## 2021-10-05 RX ADMIN — DEXTROMETHORPHAN 30 MG: 30 SUSPENSION, EXTENDED RELEASE ORAL at 20:32

## 2021-10-05 NOTE — PROGRESS NOTES
Infectious Disease Progress Note               Subjective:   Pt seen and examined at bedside. Doing well, denies new complaints. Reports dizziness and exertional dyspnea  Objective:   Physical Exam:     Visit Vitals  /67 (BP 1 Location: Right upper arm, BP Patient Position: At rest)   Pulse 74   Temp 97.6 °F (36.4 °C)   Resp 18   Ht 5' 8\" (1.727 m)   Wt 250 lb (113.4 kg)   SpO2 96%   BMI 38.01 kg/m²    O2 Flow Rate (L/min): 2 l/min O2 Device: Nasal cannula    Temp (24hrs), Av.9 °F (37.2 °C), Min:97.6 °F (36.4 °C), Max:100.1 °F (37.8 °C)    No intake/output data recorded. 10/04 0701 - 10/05 1900  In: -   Out: 400 [Urine:400]    General: NAD, AAO x 3  HEENT: SONNY, Moist mucosa   Lungs: CTA b/l, no wheeze/rhonchi, decreased at the bases   Heart: S1S2+, RRR, no murmur  Abdo: Soft, NT, ND, +BS   : no indwelling lange cath   Exts: No edema, + pulses b/l   Skin: No wounds, No rashes or lesions      Data Review:       Recent Days:  Recent Labs     10/05/21  0958 10/04/21  0535   WBC 2.2* 5.8   HGB 12.3 12.6   HCT 38.3 38.6    294     Recent Labs     10/05/21  0958 10/04/21  0535   BUN 9 9   CREA 0.53* 0.71       Lab Results   Component Value Date/Time    C-Reactive protein 1.92 (H) 10/04/2021 11:20 AM          Microbiology     Results     Procedure Component Value Units Date/Time    COVID-19 RAPID TEST [[id number]]  (Abnormal) Collected: 10/04/21 1700    Order Status: Completed Specimen: Nasopharyngeal Updated: 10/04/21 182     Specimen source Nasopharyngeal        COVID-19 rapid test DETECTED        Comment: Rapid Abbott ID Now   The specimen is POSITIVE for SARS-CoV-2, the novel coronavirus associated with COVID-19. This test has been authorized by the FDA under an Emergency Use Authorization (EUA) for use by authorized laboratories.    Fact sheet for Healthcare Providers: ConventionUpdate.co.nz Fact sheet for Patients: iTendency.uy   Methodology: Isothermal Nucleic Acid Amplification Results verified, phoned to and read back by MARIA C ROGERS R.N. [id number]   10/04/2021 BY DPW                  Diagnostics   CXR Results  (Last 48 hours)               10/04/21 0634  XR CHEST SNGL V Final result    Impression:  Bilateral multifocal infiltrates. Pattern is typical of Covid 19   pneumonia. Other etiologies might also be considered. .                    Narrative:  PROCEDURE: XR CHEST SNGL V       HISTORY:Chest pain       COMPARISON:None           TECHNIQUE: AP portable chest       LIMITATIONS: None       TUBES/LINES: None       LUNG PARENCHYMA/PLEURA: There are patchy hazy areas of alveolar density in both   mid and lower lung zones. Left costophrenic angle is obscured   TRACHEA/BRONCHI: Normal   PULMONARY VESSELS: Normal   HEART: Normal   MEDIASTINUM: Normal   BONE/SOFT TISSUES: No acute process       OTHER: None                    Assessment/Plan     1. Covid pneumonitis, moderate disease. S/p monoclonal Ab infusion PTA ([facility])        Leukopenic, Tmax of 100.1F today        Continue Steroid therapy, and supportive care        Given incentive spirometer for pulmonary toilet, instructed on use       Repeat CXR in the morning. Likely d/c home on oral steroid taper     2. Non -productive cough: due to # 1, symptomatic mgt      3. Obesity: Likely complicating # 1     4. Leukopenia likely due to #1.  Routine labs in the morning      Nohelia Dave MD    10/5/2021

## 2021-10-05 NOTE — PROGRESS NOTES
Hospitalist Progress Note         RONNY Calderon, FNP-C    Daily Progress Note: 10/5/2021    Hospital course to date: Patient is a 67-year-old female without past medical history who was began with symptoms of cough and shortness of breath 11 days ago, diagnosed with COVID-19 7 days ago. She was doing well at home until the past 2 days when she developed worsening shortness of breath. Patient was recently seen at another facility, received doxycycline, steroids and treatment with monoclonal antibodies yesterday at a different hospital.  Despite that, her condition still worsened. In the ED she was hypoxic and required 4 L nasal cannula. Heart rate was 120. CT of the chest was compatible with COVID-19. She was admitted and started on ceftriaxone and doxycycline. Subjective:   Subjective   Patient examined alert and oriented lying in bed  Reports fever overnight  No acute distress noted on examination    Review of Systems:   Review of Systems   Constitutional: Positive for chills and fever. HENT: Negative. Eyes: Negative. Respiratory: Positive for shortness of breath. Cardiovascular: Negative. Gastrointestinal: Negative. Genitourinary: Negative. Musculoskeletal: Negative. Skin: Negative. Neurological: Negative. Endo/Heme/Allergies: Negative. Psychiatric/Behavioral: Negative. Objective:   Objective      Vitals:  Patient Vitals for the past 12 hrs:   Temp Pulse Resp BP SpO2   10/05/21 0950 97.6 °F (36.4 °C) 74 18 121/67 96 %   10/05/21 0933 -- -- -- -- 95 %        Physical Exam:  Physical Exam  Vitals and nursing note reviewed. Constitutional:       Appearance: She is obese. HENT:      Mouth/Throat:      Pharynx: Oropharynx is clear. Eyes:      Conjunctiva/sclera: Conjunctivae normal.   Cardiovascular:      Rate and Rhythm: Normal rate and regular rhythm. Pulses: Normal pulses. Heart sounds: Normal heart sounds.    Pulmonary:      Comments: 2L NC, diminished air entry  Abdominal:      General: Bowel sounds are normal.      Palpations: Abdomen is soft. Musculoskeletal:         General: Normal range of motion. Cervical back: Normal range of motion. Skin:     General: Skin is warm and dry. Capillary Refill: Capillary refill takes less than 2 seconds. Neurological:      Mental Status: She is alert and oriented to person, place, and time. Psychiatric:         Mood and Affect: Mood normal.          Lab Results:  Recent Results (from the past 24 hour(s))   C REACTIVE PROTEIN, QT    Collection Time: 10/04/21 11:20 AM   Result Value Ref Range    C-Reactive protein 1.92 (H) 0.00 - 0.60 mg/dL   LD    Collection Time: 10/04/21 11:20 AM   Result Value Ref Range     (H) 81 - 246 U/L   FERRITIN    Collection Time: 10/04/21 11:20 AM   Result Value Ref Range    Ferritin 90 8 - 252 ng/mL   COVID-19 RAPID TEST    Collection Time: 10/04/21  5:00 PM   Result Value Ref Range    Specimen source Nasopharyngeal      COVID-19 rapid test DETECTED (A) Not Detected            Diagnostic Images:  CT Results  (Last 48 hours)               10/04/21 0658  CTA CHEST W OR W WO CONT Final result    Impression:  No pulmonary embolism definitively identified. Multifocal airspace   opacities in the lungs compatible with Covid-19 pneumonia. Other findings as   above. Narrative:  CT angiography of the chest, 10/4/2021       History: Hypoxia. Tachycardia. Covid-19. Technique: Multiple contiguous axial images were acquired from the thoracic   inlet to diaphragm following intravenous administration of 100 mL Isovue-370   contrast material utilizing a CT pulmonary angiography technique. Coronal,   sagittal and MIP reconstruction of images was made.        All CT scans at this facility are performed using dose reduction optimization   techniques as appropriate to perform the exam including the following: Automated   exposure control, adjustments of the mA and/or kV according to patient size, or   use iterative reconstruction technique. Comparison: Chest radiograph, same day. Findings:       CT pulmonary angiography: Evaluation of pulmonary arteries is limited by   airspace opacities detailed below. The main pulmonary artery measures 2.3 cm   which is within normal limits. No pulmonary embolism is definitively   identified. CT chest: The included thyroid gland has no focal abnormality. No axillary or hilar lymphadenopathy study. Subcarinal lymph node measuring 10   mm in short axis is at the upper limits of normal.       The heart size is at the upper limits of normal.  No significant pericardial   effusion is evident. The thoracic aorta is normal in caliber and has   atherosclerotic plaque. The thoracic aorta is normal in caliber. The lungs demonstrate multifocal airspace opacities in all lobes most pronounced   in the right middle lobe and left base compatible with Covid-19 pneumonia. No   pleural effusion or pneumothorax is identified. The lumbar spine and bony pelvis are intact. Included liver, gallbladder, pancreas show no focal abnormality on this   noncontrast enhanced study. The adrenal glands and spleen images normally.                  Current Medications:    Current Facility-Administered Medications:     sodium chloride (NS) flush 5-40 mL, 5-40 mL, IntraVENous, Q8H, Milagros Rachel MD, 10 mL at 10/05/21 0626    sodium chloride (NS) flush 5-40 mL, 5-40 mL, IntraVENous, PRN, Ashli Herrera MD    acetaminophen (TYLENOL) tablet 650 mg, 650 mg, Oral, Q6H PRN, 650 mg at 10/04/21 2021 **OR** acetaminophen (TYLENOL) suppository 650 mg, 650 mg, Rectal, Q6H PRN, Ashli Herrera MD    polyethylene glycol (MIRALAX) packet 17 g, 17 g, Oral, DAILY PRN, Ashli Herrera MD    ondansetron (ZOFRAN ODT) tablet 4 mg, 4 mg, Oral, Q8H PRN **OR** ondansetron (ZOFRAN) injection 4 mg, 4 mg, IntraVENous, Q6H PRN, Wilson Ellis Brito MD    zinc sulfate (ZINCATE) 50 mg zinc (220 mg) capsule 1 Capsule, 1 Capsule, Oral, DAILY, Mateo Dooley MD, 1 Capsule at 10/04/21 3250    cholecalciferol (VITAMIN D3) (1000 Units /25 mcg) tablet 1,000 Units, 1,000 Units, Oral, DAILY, Mateo Dooley MD, 1,000 Units at 10/04/21 0835    benzonatate (TESSALON) capsule 100 mg, 100 mg, Oral, TID PRN, Mateo Dooley MD, 100 mg at 10/04/21 2021    albuterol-ipratropium (DUO-NEB) 2.5 MG-0.5 MG/3 ML, 3 mL, Nebulization, Q4H PRN, Mateo Dooley MD    LORazepam (ATIVAN) tablet 0.5 mg, 0.5 mg, Oral, Q4H PRN, Mateo Dooley MD, 0.5 mg at 10/04/21 2317    melatonin tablet 5 mg, 5 mg, Oral, QHS PRN, Mateo Dooley MD    dexamethasone (DECADRON) 4 mg/mL injection 6 mg, 6 mg, IntraVENous, Q12H, Humberto Aguilera MD, 6 mg at 10/04/21 2021    enoxaparin (LOVENOX) injection 30 mg, 30 mg, SubCUTAneous, Q12H, Humberto Aguilera MD, 30 mg at 10/04/21 2022    pantoprazole (PROTONIX) tablet 40 mg, 40 mg, Oral, DAILY, Author MD Tena, 40 mg at 10/04/21 1540       ASSESSMENT:  1. Covid pneumonia, bilaterally: Noted on chest x-ray. Status post outpatient monoclonal antibody treatment 10/3. Continue Decadron twice daily, supplemental O2 currently on 2 L. Noted to be febrile overnight temperature 100.1. Infectious disease following. CT chest negative for PE.  , ferritin 90, CRP 1.92, WBC 5.8. 2. ARF w/ hypoxia: secondary to above. Continue above tx plan     3. Anxiety d/o: tx w/ ativan prn at home, will continue    4. Morbid obesity: dietary and lifestyle modifications discussed        Full Code  Dvt Prophylaxis lovenox  GI Prophylaxis protonix  Disposition: pending afebrile x 24 hrs, overall clinical improvement      Above treatment plan reviewed and discussed with patient in detail at bedside, all questions answered. Care Plan discussed with:  Interdisciplinary team    Total time spent with patient: >35 minutes.     Rock Hodgkins, NP

## 2021-10-05 NOTE — PROGRESS NOTES
Primary Nurse Fredis Ontiveros RN and Calista Felty, RN performed a dual skin assessment on this patient. Moisture associated area noted to left breast. Skin otherwise intact.  Jason score is 20

## 2021-10-05 NOTE — ROUTINE PROCESS
Bedside and Verbal shift change report given to Zulma (oncoming nurse) by Bhavani Gallagher (offgoing nurse). Report included the following information SBAR and MAR.

## 2021-10-06 ENCOUNTER — APPOINTMENT (OUTPATIENT)
Dept: GENERAL RADIOLOGY | Age: 36
DRG: 137 | End: 2021-10-06
Attending: INTERNAL MEDICINE
Payer: MEDICAID

## 2021-10-06 VITALS
SYSTOLIC BLOOD PRESSURE: 133 MMHG | HEART RATE: 71 BPM | TEMPERATURE: 97.5 F | RESPIRATION RATE: 18 BRPM | HEIGHT: 68 IN | OXYGEN SATURATION: 97 % | BODY MASS INDEX: 37.89 KG/M2 | DIASTOLIC BLOOD PRESSURE: 83 MMHG | WEIGHT: 250 LBS

## 2021-10-06 LAB
ALBUMIN SERPL-MCNC: 3.1 G/DL (ref 3.5–5)
ANION GAP SERPL CALC-SCNC: 6 MMOL/L (ref 5–15)
BASOPHILS # BLD: 0 K/UL (ref 0–0.1)
BASOPHILS NFR BLD: 0 % (ref 0–1)
BUN SERPL-MCNC: 11 MG/DL (ref 6–20)
BUN/CREAT SERPL: 23 (ref 12–20)
CA-I BLD-MCNC: 8.7 MG/DL (ref 8.5–10.1)
CHLORIDE SERPL-SCNC: 112 MMOL/L (ref 97–108)
CO2 SERPL-SCNC: 26 MMOL/L (ref 21–32)
CREAT SERPL-MCNC: 0.48 MG/DL (ref 0.55–1.02)
CRP SERPL-MCNC: 0.88 MG/DL (ref 0–0.6)
DIFFERENTIAL METHOD BLD: ABNORMAL
EOSINOPHIL # BLD: 0 K/UL (ref 0–0.4)
EOSINOPHIL NFR BLD: 0 % (ref 0–7)
ERYTHROCYTE [DISTWIDTH] IN BLOOD BY AUTOMATED COUNT: 13.7 % (ref 11.5–14.5)
GLUCOSE SERPL-MCNC: 124 MG/DL (ref 65–100)
HCT VFR BLD AUTO: 39.9 % (ref 35–47)
HGB BLD-MCNC: 12.8 G/DL (ref 11.5–16)
IMM GRANULOCYTES # BLD AUTO: 0 K/UL
IMM GRANULOCYTES NFR BLD AUTO: 0 %
LYMPHOCYTES # BLD: 1.2 K/UL (ref 0.8–3.5)
LYMPHOCYTES NFR BLD: 33 % (ref 12–49)
MCH RBC QN AUTO: 28.1 PG (ref 26–34)
MCHC RBC AUTO-ENTMCNC: 32.1 G/DL (ref 30–36.5)
MCV RBC AUTO: 87.5 FL (ref 80–99)
METAMYELOCYTES NFR BLD MANUAL: 2 %
MONOCYTES # BLD: 0.2 K/UL (ref 0–1)
MONOCYTES NFR BLD: 6 % (ref 5–13)
NEUTS BAND NFR BLD MANUAL: 3 % (ref 0–6)
NEUTS SEG # BLD: 2 K/UL (ref 1.8–8)
NEUTS SEG NFR BLD: 52 % (ref 32–75)
NRBC # BLD: 0 K/UL (ref 0–0.01)
NRBC BLD-RTO: 0 PER 100 WBC
OTHER CELLS NFR BLD MANUAL: 4 %
PHOSPHATE SERPL-MCNC: 3.5 MG/DL (ref 2.6–4.7)
PLATELET # BLD AUTO: 376 K/UL (ref 150–400)
PMV BLD AUTO: 9.8 FL (ref 8.9–12.9)
POTASSIUM SERPL-SCNC: 4.2 MMOL/L (ref 3.5–5.1)
RBC # BLD AUTO: 4.56 M/UL (ref 3.8–5.2)
RBC MORPH BLD: ABNORMAL
SODIUM SERPL-SCNC: 144 MMOL/L (ref 136–145)
WBC # BLD AUTO: 3.7 K/UL (ref 3.6–11)

## 2021-10-06 PROCEDURE — 74011250637 HC RX REV CODE- 250/637: Performed by: INTERNAL MEDICINE

## 2021-10-06 PROCEDURE — 77010033678 HC OXYGEN DAILY

## 2021-10-06 PROCEDURE — 74011250637 HC RX REV CODE- 250/637: Performed by: HOSPITALIST

## 2021-10-06 PROCEDURE — 99232 SBSQ HOSP IP/OBS MODERATE 35: CPT | Performed by: INTERNAL MEDICINE

## 2021-10-06 PROCEDURE — 94760 N-INVAS EAR/PLS OXIMETRY 1: CPT

## 2021-10-06 PROCEDURE — 74011250636 HC RX REV CODE- 250/636: Performed by: INTERNAL MEDICINE

## 2021-10-06 PROCEDURE — 80069 RENAL FUNCTION PANEL: CPT

## 2021-10-06 PROCEDURE — 86140 C-REACTIVE PROTEIN: CPT

## 2021-10-06 PROCEDURE — 36415 COLL VENOUS BLD VENIPUNCTURE: CPT

## 2021-10-06 PROCEDURE — 85025 COMPLETE CBC W/AUTO DIFF WBC: CPT

## 2021-10-06 PROCEDURE — 71045 X-RAY EXAM CHEST 1 VIEW: CPT

## 2021-10-06 RX ORDER — ZINC SULFATE 50(220)MG
1 CAPSULE ORAL DAILY
Qty: 14 CAPSULE | Refills: 0 | Status: SHIPPED | OUTPATIENT
Start: 2021-10-07 | End: 2021-10-21

## 2021-10-06 RX ORDER — DEXTROMETHORPHAN POLISTIREX 30 MG/5ML
30 SUSPENSION ORAL EVERY 12 HOURS
Qty: 100 ML | Refills: 0 | Status: SHIPPED | OUTPATIENT
Start: 2021-10-06 | End: 2021-10-16

## 2021-10-06 RX ORDER — MELATONIN
1000 DAILY
Qty: 30 TABLET | Refills: 0 | Status: SHIPPED | OUTPATIENT
Start: 2021-10-07 | End: 2021-11-06

## 2021-10-06 RX ORDER — PREDNISONE 10 MG/1
TABLET ORAL
Qty: 21 TABLET | Refills: 0 | Status: SHIPPED | OUTPATIENT
Start: 2021-10-06

## 2021-10-06 RX ADMIN — DEXAMETHASONE SODIUM PHOSPHATE 6 MG: 4 INJECTION, SOLUTION INTRAMUSCULAR; INTRAVENOUS at 08:49

## 2021-10-06 RX ADMIN — DEXTROMETHORPHAN 30 MG: 30 SUSPENSION, EXTENDED RELEASE ORAL at 08:49

## 2021-10-06 RX ADMIN — Medication 10 ML: at 13:37

## 2021-10-06 RX ADMIN — Medication 10 ML: at 05:40

## 2021-10-06 RX ADMIN — PANTOPRAZOLE SODIUM 40 MG: 40 TABLET, DELAYED RELEASE ORAL at 08:49

## 2021-10-06 RX ADMIN — ENOXAPARIN SODIUM 30 MG: 40 INJECTION SUBCUTANEOUS at 08:49

## 2021-10-06 RX ADMIN — Medication 1000 UNITS: at 08:49

## 2021-10-06 RX ADMIN — ZINC SULFATE 220 MG (50 MG) CAPSULE 1 CAPSULE: CAPSULE at 08:49

## 2021-10-06 NOTE — PROGRESS NOTES
Discharge plan of care/case management plan validated with provider discharge order. I have reviewed discharge instructions, follow up appointments, and medications with patient. Patient  verbalized understanding. Patient escorted via wheelchair with all belongings to front entrance with family.

## 2021-10-06 NOTE — DISCHARGE SUMMARY
Admit date: 10/4/2021   Admitting Provider: Michelle Bonner MD    Discharge date: 10/6/2021  Discharging Provider: Belvin Councilman, NP      * Admission Diagnoses: COVID-19 [U07.1]    * Discharge Diagnoses:    Hospital Problems as of 10/6/2021 Never Reviewed        Codes Class Noted - Resolved POA    * (Principal) COVID-19 ICD-10-CM: U07.1  ICD-9-CM: 079.89  10/4/2021 - Present               * Hospital Course: Hospital course to date: Patient is a 41-year-old female without past medical history who was began with symptoms of cough and shortness of breath 11 days ago, diagnosed with COVID-19 7 days ago. Natalie Avendaño was doing well at home until the past 2 days when she developed worsening shortness of breath.  Patient was recently seen at another facility, received doxycycline, steroids and treatment with monoclonal antibodies yesterday at a different hospital.  Despite that, her condition still worsened.  In the ED she was hypoxic and required 4 L nasal cannula.  Heart rate was 120.  CT of the chest was compatible with COVID-19. She was admitted and started on ceftriaxone and doxycycline. Status post outpatient monoclonal antibody treatment 10/3. Continue Decadron twice daily, supplemental O2 currently on 2 L. Infectious disease following. CT chest negative for PE.  , ferritin 90, CRP 1.92, WBC 5.8. Patient to discharge on tapering dose steroids. 6 minute walk study  Patient spo2 on room air at rest was 96%. Patient spo2 while ambulating was 97%. Patient spo2 after ambulation on room air was 97%. * Procedures:   * No surgery found *      Consults: ID    Significant Diagnostic Studies:     Discharge Exam:  Vitals and nursing note reviewed. Constitutional:       Appearance: She is obese. HENT:      Mouth/Throat:      Pharynx: Oropharynx is clear. Eyes:      Conjunctiva/sclera: Conjunctivae normal.   Cardiovascular:      Rate and Rhythm: Normal rate and regular rhythm. Pulses: Normal pulses.       Heart sounds: Normal heart sounds. Pulmonary:      Comments: 2L NC, diminished air entry  Abdominal:      General: Bowel sounds are normal.      Palpations: Abdomen is soft. Musculoskeletal:         General: Normal range of motion. Cervical back: Normal range of motion. Skin:     General: Skin is warm and dry. Capillary Refill: Capillary refill takes less than 2 seconds. Neurological:      Mental Status: She is alert and oriented to person, place, and time. Psychiatric:         Mood and Affect: Mood normal.     * Discharge Condition: stable  * Disposition: Home    Discharge Medications:  Current Discharge Medication List      START taking these medications    Details   dextromethorphan (DELSYM) 30 mg/5 mL liquid Take 5 mL by mouth every twelve (12) hours for 10 days. Qty: 100 mL, Refills: 0  Start date: 10/6/2021, End date: 10/16/2021      zinc sulfate (ZINCATE) 50 mg zinc (220 mg) capsule Take 1 Capsule by mouth daily for 14 days. Qty: 14 Capsule, Refills: 0  Start date: 10/7/2021, End date: 10/21/2021      cholecalciferol (VITAMIN D3) (1000 Units /25 mcg) tablet Take 1 Tablet by mouth daily for 30 days. Qty: 30 Tablet, Refills: 0  Start date: 10/7/2021, End date: 11/6/2021      predniSONE (STERAPRED DS) 10 mg dose pack See administration instruction per 10mg dose pack  Qty: 21 Tablet, Refills: 0  Start date: 10/6/2021      albuterol sulfate 90 mcg/actuation aebs Take 2 Puffs by inhalation every six (6) hours as needed for Wheezing, Shortness of Breath or Respiratory Distress. Qty: 1 Each, Refills: 0  Start date: 10/6/2021         CONTINUE these medications which have NOT CHANGED    Details   benzonatate (TESSALON) 100 mg capsule Take 1 Capsule by mouth three (3) times daily as needed. LORazepam (ATIVAN) 0.5 mg tablet Take 0.5 mg by mouth two (2) times daily as needed.          STOP taking these medications       doxycycline (VIBRAMYCIN) 100 mg capsule Comments:   Reason for Stopping: * Follow-up Care/Patient Instructions:   Activity: Activity as tolerated  Diet: Low fat, Low cholesterol  Wound Care: None needed    Patient to continue all medications as prescribed  Patient to maintain all follow up appointments  Patient counseled on preventative measurements  Patient counseled on covid hygiene    Follow-up Information     Follow up With Specialties Details Why Contact Info    Gómez Bush MD Family Medicine In 1 week  [address] [facility]  [phone]      Eagle Morales MD Infectious Disease  As needed [address] [facility]  [phone]          Time Spent: > 35 minutes    CC: Gómez Bush MD    Signed:  Mary Styles NP  10/6/2021  11:21 AM

## 2021-10-06 NOTE — PROGRESS NOTES
Patient spo2 on room air at rest was 96%. Patient spo2 while ambulating was 97%. Patient spo2 after ambulation on room air was 97%.

## 2021-10-06 NOTE — PROGRESS NOTES
1700- Pt c/o increased tightness in chest, dry cough, and dizziness over last hour. NP notified of status change. EKG and troponin ordered and performed. Patient remains on 2L/02, dangles at bedside to decrease dizziness before slowly rising to go to bathroom; diaphoretic but calm and trying to relax with coughing episodes. Carmencita Gonzales, SHERLY, RN.

## 2021-10-06 NOTE — DISCHARGE INSTRUCTIONS
Patient Education        Learning About COVID-19 and Flu Symptoms  How can you tell COVID-19 from the flu? COVID-19 and the flu have similar symptoms. The two can be hard to tell apart. The only way to know for sure which illness you have is to be tested. Since the symptoms are so alike, it makes sense to act as if you have COVID-19 until your test results come back. This means staying home and limiting contact with people in your home. You'll need to wash your hands often and disinfect surfaces that you touch. And be sure to wear a mask when you're around other people. This is also good advice if you think you have the flu. COVID-19 and the flu have these symptoms in common:  · Fever or chills  · Cough  · Shortness of breath  · Fatigue (tiredness)  · Sore throat  · Runny or stuffy nose  · Muscle and body aches  · Headache  · Vomiting and diarrhea (more common in children than adults)  COVID-19 has another symptom that also may occur:  · New loss of taste or smell  COVID-19 symptoms may appear from 2 to 14 days after infection. Flu symptoms usually appear 1 to 4 days after infection. Why should you get a flu shot during the COVID-19 pandemic? It's important to get your yearly flu vaccine. Both the flu and COVID-19 can be active at the same time. You can get sick with both infections at once. And having both may make you more sick than getting just one. The flu vaccine won't protect you from COVID-19. But it can help prevent the flu or reduce its symptoms. If fewer people get very ill with the flu, this will help free up medical resources that are needed for COVID-19 patients. Where can you learn more? Go to http://www.[facility].com/  Enter C123 in the search box to learn more about \"Learning About COVID-19 and Flu Symptoms. \"  Current as of: March 26, 2021               Content Version: 13.0  © 7645-1021 [employer], Incorporated.    Care instructions adapted under license by [employer] (which disclaims liability or warranty for this information). If you have questions about a medical condition or this instruction, always ask your healthcare professional. [place] [address] any warranty or liability for your use of this information. Follow-up Care/Patient Instructions:   Activity: Activity as tolerated  Diet: Low fat, Low cholesterol  Wound Care: None needed     Patient to continue all medications as prescribed  Patient to maintain all follow up appointments  Patient counseled on preventative measurements  Patient counseled on covid hygiene              Follow-up Information      Follow up With Specialties Details Why Contact Info     Rodrick Egan MD Family Medicine In 1 week   [address] [facility]  [address] [facility]  [phone]        Aye Jarquin MD Infectious Disease   As needed [address] [facility] [address]  [phone]

## 2021-10-06 NOTE — PROGRESS NOTES
CM gave patient Noncovered Continued Stay Notice. Patient will discharge today and call her  for a ride home.

## 2021-10-06 NOTE — PROGRESS NOTES
Bedside and Verbal shift change report given to Jostin Cardenas (oncoming nurse) by Timbo Snider (offgoing nurse). Report included the following information SBAR and MAR.

## 2021-10-06 NOTE — PROGRESS NOTES
Infectious Disease Progress Note         Subjective:   Pt seen and examined at bedside. Stable, reports heaviness in the chest, maintaining O2 sats on RA. Objective:   Physical Exam:     Visit Vitals  /83 (BP 1 Location: Right upper arm, BP Patient Position: At rest)   Pulse 71   Temp 97.5 °F (36.4 °C)   Resp 18   Ht 5' 8\" (1.727 m)   Wt 250 lb (113.4 kg)   SpO2 97%   BMI 38.01 kg/m²    O2 Flow Rate (L/min): 2 l/min O2 Device: Nasal cannula    Temp (24hrs), Av.7 °F (36.5 °C), Min:97.5 °F (36.4 °C), Max:97.9 °F (36.6 °C)    No intake/output data recorded. No intake/output data recorded. General: NAD, AAO x 3  HEENT: SONNY, Moist mucosa   Lungs: CTA b/l, no wheeze/rhonchi, decreased at the bases   Heart: S1S2+, RRR, no murmur  Abdo: Soft, NT, ND, +BS   : no indwelling lange cath   Exts: No edema, + pulses b/l   Skin: No wounds, No rashes or lesions      Data Review:       Recent Days:  Recent Labs     10/06/21  0849 10/05/21  0958 10/04/21  0535   WBC 3.7 2.2* 5.8   HGB 12.8 12.3 12.6   HCT 39.9 38.3 38.6    317 294     Recent Labs     10/06/21  0849 10/05/21  0958 10/04/21  0535   BUN 11 9 9   CREA 0.48* 0.53* 0.71       Lab Results   Component Value Date/Time    C-Reactive protein 0.88 (H) 10/06/2021 08:49 AM          Microbiology     Results     Procedure Component Value Units Date/Time    COVID-19 RAPID TEST [[id number]]  (Abnormal) Collected: 10/04/21 1700    Order Status: Completed Specimen: Nasopharyngeal Updated: 10/04/21 182     Specimen source Nasopharyngeal        COVID-19 rapid test DETECTED        Comment: Rapid Abbott ID Now   The specimen is POSITIVE for SARS-CoV-2, the novel coronavirus associated with COVID-19. This test has been authorized by the FDA under an Emergency Use Authorization (EUA) for use by authorized laboratories.    Fact sheet for Healthcare Providers: ConventionUpdate.co.nz Fact sheet for Patients: [facility]Update[facility]   Methodology: Isothermal Nucleic Acid Amplification Results verified, phoned to and read back by MARIA C ROGERS R.N. [id number]   10/04/2021 BY DPW                  Diagnostics   CXR Results  (Last 48 hours)               10/06/21 1026  XR CHEST PORT Final result    Impression:  Bilateral airspace disease similar compared to the prior exam.       Narrative:  Clinical history: Covid pneumonia       Comparison: Chest radiograph 10/4/2021. Findings:    Single view chest. The lungs are adequately expanded, with patchy airspace   opacities in the bilateral lower lung zones, similar compared to the prior exam.   No pleural effusion or pneumothorax. Cardiac silhouette is normal in size. The   osseous structures are intact. Assessment/Plan     1. Covid pneumonitis, moderate disease. S/p monoclonal Ab infusion PTA (Regeneron)        Afebrile, WBC normalized on todays labs        Maintaining O2 sats on RA, denies cough       No worsening infiltrates on CXR       Continue on decadron, change to oral upon d/c          2. Non -productive cough: due to # 1      Improved, continue symptomatic mgt      3. Obesity: Likely complicating # 1     4. Leukopenia likely due to #1.  Resolved on todays labs, WBC WNLs     Dispo: Cleared for d/c from ID stand point, but wants to stay one more night      Ana Cristina Hernández MD    10/6/2021

## 2021-10-07 ENCOUNTER — PATIENT OUTREACH (OUTPATIENT)
Dept: CASE MANAGEMENT | Age: 36
End: 2021-10-07

## 2021-10-07 NOTE — PROGRESS NOTES
10/07/21     Care Transitions Initial Call    Call within 2 business days of discharge: Yes     Patient: Raphael Hamilton Patient : 1985 MRN: [id number]    Last Discharge [address]       Complaint Diagnosis Description Type Department Provider    10/4/21 Chest Pain COVID-19 . .. ED to Hosp-Admission (Discharged) (ADMIT) Omi Klein MD; [facility]... Was this an external facility discharge? No     Challenges to be reviewed by the provider   Additional needs identified to be addressed with provider: yes  Pt may have questions for you about when to get the COVID vaccine since she has had the disease and has received Regeneron. She also may ask for information on low fat, low cholesterol diet which was recommended on her her D/C papers       Method of communication with provider : chart routing    Discussed COVID-19 related testing which was available at this time. Test results were positive. Patient informed of results, if available? yes   On admission, pt had been dx'd with COVID 11 days prior and had received Regeneron the day before admission, per ED notes    Advance Care Planning:   Does patient have an Advance Directive:  pt is not interested in completing an ACP and denies needing any information on this    Inpatient Readmission Risk score: Unplanned Readmit Risk Score: 11    Was this a readmission? no   Patient stated reason for the admission: trouble breathing, pain in my chest, dizzy    Patients top risk factors for readmission: medical condition-COVID   Interventions to address risk factors: Scheduled appointment with PCP-10/20 @ 11:20A; advised pt to call practice to see if she can get in earlier if there's a cancellation--she is agreeable to this; Scheduled appointment with Specialist-encouraged pt to sched F/U with ID MD, Dr. Giuseppe Nsah and pt encouraged to check pulse ox she has at home. I will ask about readings on next call.     Care Transition Nurse (CTN) contacted the patient by telephone to perform post hospital discharge assessment. Verified name and  with patient as identifiers. Provided introduction to self, and explanation of the CTN role. CTN reviewed discharge instructions, medical action plan and red flags with patient who verbalized understanding. Were discharge instructions available to patient? yes. Reviewed appropriate site of care based on symptoms and resources available to patient including: PCP and Specialist. Patient given an opportunity to ask questions and does not have any further questions or concerns at this time. The patient agrees to contact the PCP office for questions related to their healthcare. Medication reconciliation was performed with patient, who verbalizes understanding of administration of home medications. Advised obtaining a 90-day supply of all daily and as-needed medications. Referral to Pharm D needed: no     Home Health/Outpatient orders at discharge: none    Durable Medical Equipment ordered at discharge: None    Covid Risk Education     CTN reviewed discharge instructions, medical action plan and red flag symptoms with the patient who verbalized understanding. Discussed COVID vaccination status: yes, pt states she and her  were planning to get the vaccine but then she contracted COVID before they received it. I asked her to discuss with her PCP how long she should wait before receiving the vaccine. Discussed exposure protocols and quarantine with CDC Guidelines. Patient was given an opportunity to verbalize any questions and concerns and agrees to contact CTN or health care provider for questions related to their healthcare. Was patient discharged with a pulse oximeter? no.     Discussed follow-up appointments.  If no appointment was previously scheduled, appointment scheduling offered: no. Is follow up appointment scheduled within 7 days of discharge? no.   [facility] follow up appointment(s): No future appointments. Non-[facility] follow up appointment(s): with PCP, Dr. Edwin Montes on 10/20/21 @ 11:20A    Plan for follow-up call in 5-7 days based on severity of symptoms and risk factors. Plan for next call: symptom management-SOB, CP, self management-pulse ox, exercise and follow up appointment-has she called PCP to request earlier appt if there's a cancellation?  did she sched F/U with ID MD?  CTN provided contact information for future needs. Goals      Attends follow-up appointments as directed. 10/07/21     --pt will attend post-hosp F/U visit with PCP on 10/20 unless earlier appt becomes available  --pt will call to sched appt with ID specialist, Dr. Stephanie Forman    Next call, inquire about above. Jenise Harrison DNP, MIGUEL-C, Care Transitions Team, (Ph) [phone]        Understands red flags post discharge. 10/07/21     --reviewed red flags for returning to ED and pt able to verbalize SOB.   Also reviewed CP and other cardiac symptoms as well as stroke symptoms using [facility] mnemonic    Next call, review current symptoms, ask about pulse ox readings, and using teach-back, ask about red flags above    Jenise Harrison DNP, MIGUEL-C, Care Transitions Team, (Ph) [phone]

## 2021-10-22 ENCOUNTER — PATIENT OUTREACH (OUTPATIENT)
Dept: CASE MANAGEMENT | Age: 36
End: 2021-10-22

## 2021-10-22 NOTE — PROGRESS NOTES
10/22/21     Reached pt and re-introduced myself to her. She states she only has a couple of minutes to talk so I asked if she was able to see her PCP for F/U. She tells me she hasn't been able yet because two more family members in her household have tested positive for COVID. This appt has been rescheduled for 11/5. She states she has not scheduled F/U with ID yet--this was suggested but not required F/U. Because I needed to keep this call short, I asked pt if I may call back back next week and she agreed.     Chio Garza DNP, FNP-C, Care Transitions Team, (Ph) [phone]

## 2021-11-08 ENCOUNTER — PATIENT OUTREACH (OUTPATIENT)
Dept: CASE MANAGEMENT | Age: 36
End: 2021-11-08

## 2021-11-08 NOTE — PROGRESS NOTES
11/08/21     Patient has graduated from the Transitions of Care Coordination  program on 11/8/21. Patient/family has the ability to self-manage at this time Care management goals have been completed. Patient was not referred to the [facility] team for further management. Goals Addressed                 This Visit's Progress     Attends follow-up appointments as directed. 10/07/21     --pt will attend post-hosp F/U visit with PCP on 10/20 unless earlier appt becomes available  --pt will call to sched appt with ID specialist, Dr. Almazan Free    Next call, inquire about above. 11/08/21   --pt states appt has been re-scheduled for 11/18. She states her son was in the hospital with RSV and has just been released in the last couple of days. --she states that she honestly hasn't thought any more about scheduling a F/U appt with the ID specialist and I advised this was on as \"as needed\" basis anyway and she can just monitor herself to see if there is any need in the future. Shira Kerns, SHERIF, FNP-C, Care Transitions Team, (Ph) [phone]        Understands red flags post discharge. 10/07/21     --reviewed red flags for returning to ED and pt able to verbalize SOB. Also reviewed CP and other cardiac symptoms as well as stroke symptoms using [facility] mnemonic    Next call, review current symptoms, ask about pulse ox readings, and using teach-back, ask about red flags above    11/08/21     --pt states she still gets SOB with activity. She reports pulse ox of 98-99% at rest, but states she hasn't checked any with activity. She reports ongoing leg swelling that is not a new problem but states she is having more restless legs at night and will discuss this with her PCP at upcoming F/U appt. I encouraged her also to get some pulse ox readings with activity and share these with PCP as well at her appt. I asked if she has any questions for me and she does not.  I advised this is my final call to her and wished her the very best before disconnecting. Srikanth Casillas, DNP, FNP-C, Care Transitions Team, (Ph) [phone]             Patient has Care Transition Nurse's contact information for any further questions, concerns, or needs. Patients upcoming visits:  No future appointments.

## 2022-03-19 PROBLEM — U07.1 COVID-19: Status: ACTIVE | Noted: 2021-10-04

## 2022-07-18 ENCOUNTER — HOSPITAL ENCOUNTER (EMERGENCY)
Age: 37
Discharge: HOME | End: 2022-07-18
Attending: STUDENT IN AN ORGANIZED HEALTH CARE EDUCATION/TRAINING PROGRAM
Payer: MEDICAID

## 2022-07-18 ENCOUNTER — APPOINTMENT (OUTPATIENT)
Dept: CT IMAGING | Age: 37
End: 2022-07-18
Attending: STUDENT IN AN ORGANIZED HEALTH CARE EDUCATION/TRAINING PROGRAM
Payer: MEDICAID

## 2022-07-18 ENCOUNTER — APPOINTMENT (OUTPATIENT)
Dept: GENERAL RADIOLOGY | Age: 37
End: 2022-07-18
Attending: STUDENT IN AN ORGANIZED HEALTH CARE EDUCATION/TRAINING PROGRAM
Payer: MEDICAID

## 2022-07-18 VITALS
DIASTOLIC BLOOD PRESSURE: 76 MMHG | RESPIRATION RATE: 16 BRPM | BODY MASS INDEX: 39.24 KG/M2 | WEIGHT: 250 LBS | TEMPERATURE: 98.2 F | SYSTOLIC BLOOD PRESSURE: 121 MMHG | OXYGEN SATURATION: 100 % | HEIGHT: 67 IN | HEART RATE: 72 BPM

## 2022-07-18 DIAGNOSIS — U07.1 COVID-19 VIRUS INFECTION: Primary | ICD-10-CM

## 2022-07-18 LAB
ANION GAP SERPL CALC-SCNC: 5 MMOL/L (ref 5–15)
BASOPHILS # BLD: 0 K/UL (ref 0–0.1)
BASOPHILS NFR BLD: 0 % (ref 0–1)
BUN SERPL-MCNC: 10 MG/DL (ref 6–20)
BUN/CREAT SERPL: 18 (ref 12–20)
CA-I BLD-MCNC: 8.9 MG/DL (ref 8.5–10.1)
CHLORIDE SERPL-SCNC: 104 MMOL/L (ref 97–108)
CO2 SERPL-SCNC: 25 MMOL/L (ref 21–32)
CREAT SERPL-MCNC: 0.57 MG/DL (ref 0.55–1.02)
D DIMER PPP FEU-MCNC: 0.54 UG/ML(FEU)
DIFFERENTIAL METHOD BLD: ABNORMAL
EOSINOPHIL # BLD: 0 K/UL (ref 0–0.4)
EOSINOPHIL NFR BLD: 0 % (ref 0–7)
ERYTHROCYTE [DISTWIDTH] IN BLOOD BY AUTOMATED COUNT: 13.8 % (ref 11.5–14.5)
GLUCOSE SERPL-MCNC: 109 MG/DL (ref 65–100)
HCG SERPL QL: NEGATIVE
HCT VFR BLD AUTO: 37.8 % (ref 35–47)
HGB BLD-MCNC: 12.3 G/DL (ref 11.5–16)
IMM GRANULOCYTES # BLD AUTO: 0 K/UL
IMM GRANULOCYTES NFR BLD AUTO: 0 %
LYMPHOCYTES # BLD: 2.5 K/UL (ref 0.8–3.5)
LYMPHOCYTES NFR BLD: 38 % (ref 12–49)
MCH RBC QN AUTO: 28.6 PG (ref 26–34)
MCHC RBC AUTO-ENTMCNC: 32.5 G/DL (ref 30–36.5)
MCV RBC AUTO: 87.9 FL (ref 80–99)
METAMYELOCYTES NFR BLD MANUAL: 1 %
MONOCYTES # BLD: 0.3 K/UL (ref 0–1)
MONOCYTES NFR BLD: 5 % (ref 5–13)
NEUTS BAND NFR BLD MANUAL: 2 % (ref 0–6)
NEUTS SEG # BLD: 3.4 K/UL (ref 1.8–8)
NEUTS SEG NFR BLD: 48 % (ref 32–75)
NRBC # BLD: 0 K/UL (ref 0–0.01)
NRBC BLD-RTO: 0 PER 100 WBC
OTHER CELLS NFR BLD MANUAL: 6 %
PLATELET # BLD AUTO: 353 K/UL (ref 150–400)
PMV BLD AUTO: 9.6 FL (ref 8.9–12.9)
POTASSIUM SERPL-SCNC: 3.9 MMOL/L (ref 3.5–5.1)
RBC # BLD AUTO: 4.3 M/UL (ref 3.8–5.2)
RBC MORPH BLD: ABNORMAL
SODIUM SERPL-SCNC: 134 MMOL/L (ref 136–145)
TROPONIN-HIGH SENSITIVITY: 3 NG/L (ref 0–51)
WBC # BLD AUTO: 6.7 K/UL (ref 3.6–11)

## 2022-07-18 PROCEDURE — 71045 X-RAY EXAM CHEST 1 VIEW: CPT

## 2022-07-18 PROCEDURE — 74011250637 HC RX REV CODE- 250/637: Performed by: STUDENT IN AN ORGANIZED HEALTH CARE EDUCATION/TRAINING PROGRAM

## 2022-07-18 PROCEDURE — 36415 COLL VENOUS BLD VENIPUNCTURE: CPT

## 2022-07-18 PROCEDURE — 84484 ASSAY OF TROPONIN QUANT: CPT

## 2022-07-18 PROCEDURE — 84703 CHORIONIC GONADOTROPIN ASSAY: CPT

## 2022-07-18 PROCEDURE — 85025 COMPLETE CBC W/AUTO DIFF WBC: CPT

## 2022-07-18 PROCEDURE — 99285 EMERGENCY DEPT VISIT HI MDM: CPT

## 2022-07-18 PROCEDURE — 93005 ELECTROCARDIOGRAM TRACING: CPT

## 2022-07-18 PROCEDURE — 80048 BASIC METABOLIC PNL TOTAL CA: CPT

## 2022-07-18 PROCEDURE — 71275 CT ANGIOGRAPHY CHEST: CPT

## 2022-07-18 PROCEDURE — 85379 FIBRIN DEGRADATION QUANT: CPT

## 2022-07-18 PROCEDURE — 74011000636 HC RX REV CODE- 636: Performed by: STUDENT IN AN ORGANIZED HEALTH CARE EDUCATION/TRAINING PROGRAM

## 2022-07-18 RX ORDER — OXYMETAZOLINE HCL 0.05 %
2 SPRAY, NON-AEROSOL (ML) NASAL
Status: COMPLETED | OUTPATIENT
Start: 2022-07-18 | End: 2022-07-18

## 2022-07-18 RX ADMIN — OXYMETAZOLINE HCL 2 SPRAY: 0.05 SPRAY NASAL at 07:33

## 2022-07-18 RX ADMIN — IOPAMIDOL 100 ML: 755 INJECTION, SOLUTION INTRAVENOUS at 07:22

## 2022-07-18 NOTE — ROUTINE PROCESS
Discharge and follow-up reviewed with pt. Pt verbalized understanding. IV discontinued intact, bleeding controlled. NAD. Ambulated self from ED.

## 2022-07-18 NOTE — DISCHARGE INSTRUCTIONS
You were seen in the ER for your COVID symptoms. Thankfully, your vital signs are all normal, including your oxygen and your heart rate. We were able to rule out a blood clot with a CT of your chest.      You should take tylenol or ibuprofen for fever, aches and pains for the next few days. You can alternate these every 3 hours so that you always have something on board. For instance, you can take tylenol at 9am, ibuprofen at noon, tylenol again at 3pm and ibuprofen again at 6pm. Take in plenty of water to stay hydrated and follow up with your primary care doctor in the next few days. Return to the ER for any uncontrollable vomiting or diarrhea, difficulty breathing, or any other new or concerning symptoms. Thank you! Thank you for allowing me to care for you in the emergency department. It is my goal to provide you with excellent care. If you have not received excellent quality care, please ask to speak to the nurse manager. Please fill out the survey that will come to you by mail or email since we listen to your feedback! Below you will find a list of your tests from today's visit. Should you have any questions, please do not hesitate to call the emergency department. Labs  Recent Results (from the past 12 hour(s))   CBC WITH AUTOMATED DIFF    Collection Time: 07/18/22  3:05 AM   Result Value Ref Range    WBC 6.7 3.6 - 11.0 K/uL    RBC 4.30 3.80 - 5.20 M/uL    HGB 12.3 11.5 - 16.0 g/dL    HCT 37.8 35.0 - 47.0 %    MCV 87.9 80.0 - 99.0 FL    MCH 28.6 26.0 - 34.0 PG    MCHC 32.5 30.0 - 36.5 g/dL    RDW 13.8 11.5 - 14.5 %    PLATELET 018 264 - 672 K/uL    MPV 9.6 8.9 - 12.9 FL    NRBC 0.0 0.0  WBC    ABSOLUTE NRBC 0.00 0.00 - 0.01 K/uL    NEUTROPHILS 48 32 - 75 %    BAND NEUTROPHILS 2 0 - 6 %    LYMPHOCYTES 38 12 - 49 %    MONOCYTES 5 5 - 13 %    EOSINOPHILS 0 0 - 7 %    BASOPHILS 0 0 - 1 %    METAMYELOCYTES 1 (H) 0 %    OTHER CELL 6 %    IMMATURE GRANULOCYTES 0 %    ABS.  NEUTROPHILS 3.4 1.8 - 8.0 K/UL    ABS. LYMPHOCYTES 2.5 0.8 - 3.5 K/UL    ABS. MONOCYTES 0.3 0.0 - 1.0 K/UL    ABS. EOSINOPHILS 0.0 0.0 - 0.4 K/UL    ABS. BASOPHILS 0.0 0.0 - 0.1 K/UL    ABS. IMM. GRANS. 0.0 K/UL    DF Manual      RBC COMMENTS Normocytic, Normochromic     METABOLIC PANEL, BASIC    Collection Time: 07/18/22  3:05 AM   Result Value Ref Range    Sodium 134 (L) 136 - 145 mmol/L    Potassium 3.9 3.5 - 5.1 mmol/L    Chloride 104 97 - 108 mmol/L    CO2 25 21 - 32 mmol/L    Anion gap 5 5 - 15 mmol/L    Glucose 109 (H) 65 - 100 mg/dL    BUN 10 6 - 20 mg/dL    Creatinine 0.57 0.55 - 1.02 mg/dL    BUN/Creatinine ratio 18 12 - 20      GFR est AA >60 >60 ml/min/1.73m2    GFR est non-AA >60 >60 ml/min/1.73m2    Calcium 8.9 8.5 - 10.1 mg/dL   TROPONIN-HIGH SENSITIVITY    Collection Time: 07/18/22  3:05 AM   Result Value Ref Range    Troponin-High Sensitivity 3 0 - 51 ng/L   D DIMER    Collection Time: 07/18/22  3:05 AM   Result Value Ref Range    D DIMER 0.54 (H) <0.50 ug/ml(FEU)   HCG QL SERUM    Collection Time: 07/18/22  3:05 AM   Result Value Ref Range    HCG, Ql. Negative Negative         Radiologic Studies  CTA CHEST W OR W WO CONT   Final Result   Small areas of mild ground glass infiltrate nonspecific but could be   seen with atypical pneumonia. No evidence for pulmonary emboli. XR CHEST PORT   Final Result   No acute findings. CT Results  (Last 48 hours)                 07/18/22 0722  CTA CHEST W OR W WO CONT Final result    Impression:  Small areas of mild ground glass infiltrate nonspecific but could be   seen with atypical pneumonia. No evidence for pulmonary emboli. Narrative:  Clinical indication: Chest pain, Covid positive. Localizer obtained without contrast at the level of the pulmonary arteries. Fast   injection rate of 100 cc of Isovue-370 with review of the raw data and MIP   reconstructions. Comparison October 4, 2021.  CT dose reduction was achieved   through the use of a standardized protocol tailored for this examination and   automatic exposure control for dose modulation . Ana Alexander Contrast injection is not optimal technically. No definite evidence to suggest pulmonary emboli. There is no pericardial pleural effusion no shift or pneumothorax. The aorta   appears unremarkable. The heart size is normal. There is no mediastinal   adenopathy. There is no parenchymal mass. There are few small area of   groundglass infiltrates bilaterally. Minimal.                 CXR Results  (Last 48 hours)                 07/18/22 0310  XR CHEST PORT Final result    Impression:  No acute findings. Narrative:  EXAM: XR CHEST PORT       INDICATION: Chest Tightness       COMPARISON: Chest x-ray 10/6/2021. CT thorax 10/4/2021. FINDINGS: A portable AP radiograph of the chest was obtained at 03:05 hours. The   patient is on a cardiac monitor. The lungs are clear. The cardiac and   mediastinal contours and pulmonary vascularity are normal.  The bones and soft   tissues are grossly within normal limits.                  ------------------------------------------------------------------------------------------------------------  The exam and treatment you received in the Emergency Department were for an urgent problem and are not intended as complete care. It is important that you follow-up with a doctor, nurse practitioner, or physician assistant to:  (1) confirm your diagnosis,  (2) re-evaluation of changes in your illness and treatment, and  (3) for ongoing care. Please take your discharge instructions with you when you go to your follow-up appointment. If you have any problem arranging a follow-up appointment, contact the Emergency Department. If your symptoms become worse or you do not improve as expected and you are unable to reach your health care provider, please return to the Emergency Department. We are available 24 hours a day.      If a prescription has been provided, please have it filled as soon as possible to prevent a delay in treatment. If you have any questions or reservations about taking the medication due to side effects or interactions with other medications, please call your primary care provider or contact the ER.

## 2022-07-18 NOTE — ED TRIAGE NOTES
Pt arrives to ED with c/o nasal congestion, cough, sore throat and chest congestion. Recently tested positive strep, flu and Covid.

## 2022-07-18 NOTE — ED PROVIDER NOTES
[facility]  EMERGENCY DEPARTMENT ENCOUNTER NOTE    Date: 7/18/2022  Patient Name: Domenick Bumpers      History of Presenting Illness     Chief Complaint   Patient presents with    Positive For Covid-19    Cold Symptoms       History Provided By: Patient    HPI: Domenick Bumpers, 29 y.o. female with H of HTN presents to the ED with persistence of symptoms of nasal congestion, chest congestion, chest tightness, coughing, fatigue and fever. Symptoms has been going for approximately a week. She reports that she got tested for flu, strep throat, and COVID a week ago that was negative. Several days ago later she has persistent symptoms and went to an urgent care tested positive for flu and strep throat. She was started on antibiotics for strep throat. Yesterday she started having anosmia and ageusia. Consequently she did a home test which was positive for COVID. She is concerned that she is having COVID-pneumonia because she had that approximately 9 months ago. There are no other complaints, changes, or physical findings at this time. PCP: Cooper Rosa MD    Current Outpatient Medications   Medication Sig Dispense Refill    predniSONE (STERAPRED DS) 10 mg dose pack See administration instruction per 10mg dose pack 21 Tablet 0    albuterol sulfate 90 mcg/actuation aebs Take 2 Puffs by inhalation every six (6) hours as needed for Wheezing, Shortness of Breath or Respiratory Distress. (Patient not taking: Reported on 10/7/2021) 1 Each 0    LORazepam (ATIVAN) 0.5 mg tablet Take 0.5 mg by mouth two (2) times daily as needed.  benzonatate (TESSALON) 100 mg capsule Take 1 Capsule by mouth three (3) times daily as needed.  (Patient not taking: Reported on 10/7/2021)         Past History     Past Medical History:  Past Medical History:   Diagnosis Date    Hypertension     Other ill-defined conditions(023.61)     palpitations       Past Surgical History:  No past surgical history on file. Family History:  History reviewed. No pertinent family history. Social History:  Social History     Tobacco Use    Smoking status: Former Smoker    Smokeless tobacco: Not on file   Substance Use Topics    Alcohol use: No    Drug use: No       Allergies: Allergies   Allergen Reactions    Codeine Nausea Only and Palpitations    Demerol [Meperidine] Itching    Zithromax [Azithromycin] Unknown (comments)     Patient said it makes \"her skin on fire\" per Olinda Lion RN         Review of Systems     Review of Systems    A 10 point review of system was performed and was negative except as noted above in HPI    Physical Exam     Physical Exam  Vitals and nursing note reviewed. Constitutional:       General: She is not in acute distress. Appearance: She is well-developed. She is not diaphoretic. HENT:      Head: Normocephalic and atraumatic. Eyes:      Extraocular Movements: Extraocular movements intact. Conjunctiva/sclera: Conjunctivae normal.   Cardiovascular:      Rate and Rhythm: Normal rate and regular rhythm. Heart sounds: Normal heart sounds. Pulmonary:      Effort: Pulmonary effort is normal.      Breath sounds: Normal breath sounds. Abdominal:      Palpations: Abdomen is soft. Tenderness: There is no abdominal tenderness. Musculoskeletal:      Cervical back: Neck supple. Right lower leg: No tenderness. No edema. Left lower leg: No tenderness. No edema. Neurological:      General: No focal deficit present. Mental Status: She is alert and oriented to person, place, and time. Lab and Diagnostic Study Results     Labs -   No results found for this or any previous visit (from the past 12 hour(s)).     Radiologic Studies -   [unfilled]  CT Results  (Last 48 hours)               07/18/22 0722  CTA CHEST W OR W WO CONT Final result    Impression:  Small areas of mild ground glass infiltrate nonspecific but could be   seen with atypical pneumonia. No evidence for pulmonary emboli. Narrative:  Clinical indication: Chest pain, Covid positive. Localizer obtained without contrast at the level of the pulmonary arteries. Fast   injection rate of 100 cc of Isovue-370 with review of the raw data and MIP   reconstructions. Comparison October 4, 2021. CT dose reduction was achieved   through the use of a standardized protocol tailored for this examination and   automatic exposure control for dose modulation . Blanchie Bevels Contrast injection is not optimal technically. No definite evidence to suggest pulmonary emboli. There is no pericardial pleural effusion no shift or pneumothorax. The aorta   appears unremarkable. The heart size is normal. There is no mediastinal   adenopathy. There is no parenchymal mass. There are few small area of   groundglass infiltrates bilaterally. Minimal.               CXR Results  (Last 48 hours)               07/18/22 0310  XR CHEST PORT Final result    Impression:  No acute findings. Narrative:  EXAM: XR CHEST PORT       INDICATION: Chest Tightness       COMPARISON: Chest x-ray 10/6/2021. CT thorax 10/4/2021. FINDINGS: A portable AP radiograph of the chest was obtained at 03:05 hours. The   patient is on a cardiac monitor. The lungs are clear. The cardiac and   mediastinal contours and pulmonary vascularity are normal.  The bones and soft   tissues are grossly within normal limits. Medical Decision Making and ED Course   - I am the first and primary provider for this patient AND AM THE PRIMARY PROVIDER OF RECORD. - I reviewed the vital signs, available nursing notes, past medical history, past surgical history, family history and social history. - Initial assessment performed. The patients presenting problems have been discussed, and the staff are in agreement with the care plan formulated and outlined with them.   I have encouraged them to ask questions as they arise throughout their visit. Vital Signs-Reviewed the patient's vital signs. Patient Vitals for the past 24 hrs:   Temp Pulse Resp BP SpO2   07/18/22 0738 98.2 °F (36.8 °C) 72 16 121/76 100 %   07/18/22 0430 -- 92 18 (!) 152/89 98 %   07/18/22 0236 98 °F (36.7 °C) (!) 103 21 (!) 158/105 97 %       Records Reviewed: Nursing Notes    Provider Notes (Medical Decision Making):         ED Course as of 07/18/22 2109 Mon Jul 18, 2022   0315 Patient with tested positive for COVID presented to the ED with constellation of symptoms including chest tightness. Patient is concerned that her presentation is similar to when she had her pneumonia due to COVID 9 months ago. I did note that the patient is tachycardic. We have CBC, chemistry, troponin, D-dimer and chest x-ray for risk stratification. [AA]   0320 EKG was obtained at 2:39 AM interpreted by me as normal sinus rhythm at a rate of 6, intervals within normal, normal axis, no ST elevation or depression, no signs of dysrhythmia or blocks. [AA]   0340 XR CHEST PORT  Negative study [AA]   0500 D DIMER(!): 0.54 [AA]   0701 Patient is signed out to the morning attending to continue patient care. Pending CTA and if is negative, patient can be discharged safely home. [AA]   0707 ASSUMPTION OF CARE NOTE    I was given sign out on this patient from the [facility] [place]. All directly relevant available labs, images, and records were reviewed. The patient is currently stable. Please see the previous note for more details. Briefly, 36F w/chest tightness pending CTA to r/o PE. Bhavik Chen MD  7:07 AM   [YA]   4051 CTA CHEST W OR W WO CONT    IMPRESSION  Small areas of mild ground glass infiltrate nonspecific but could be  seen with atypical pneumonia. No evidence for pulmonary emboli. [YA]   [id number] Will discharge with return precautions. [YA]      ED Course User Index  [AA] Aura Rousseau MD  [YA] Jimy Colindres MD         Diagnosis     Clinical Impression:   1. COVID-19 virus infection          Disposition     Disposition: Condition stable  DC- Adult Discharges: All of the diagnostic tests were reviewed and questions answered. Diagnosis, care plan and treatment options were discussed. The patient understands the instructions and will follow up as directed. The patients results have been reviewed with them. They have been counseled regarding their diagnosis. The patient verbally convey understanding and agreement of the signs, symptoms, diagnosis, treatment and prognosis and additionally agrees to follow up as recommended with their PCP in 24 - 48 hours. They also agree with the care-plan and convey that all of their questions have been answered. I have also put together some discharge instructions for them that include: 1) educational information regarding their diagnosis, 2) how to care for their diagnosis at home, as well a 3) list of reasons why they would want to return to the ED prior to their follow-up appointment, should their condition change. Discharged      DISCHARGE PLAN:  1. Follow-up Information     Follow up With Specialties Details Why [address] EMERGENCY DEPT Emergency Medicine Go to  As needed, If symptoms worsen [facility]    Sharee Arvizu MD Family Medicine Schedule an appointment as soon as possible for a visit in 3 days For reevaluation, Discuss your visit to the ER [place] [id number] [phone]          2. Return to ED if worse   3. Discharge Medication List as of 7/18/2022  9:52 AM            Attestations: Kiran Gates MD    Please note that this dictation was completed with [employer], the computer voice recognition software. Quite often unanticipated grammatical, syntax, homophones, and other interpretive errors are inadvertently transcribed by the computer software. Please disregard these errors.   Please excuse any errors that have escaped final proofreading. Thank you.

## 2022-07-19 LAB
ATRIAL RATE: 96 BPM
CALCULATED P AXIS, ECG09: 58 DEGREES
CALCULATED R AXIS, ECG10: 13 DEGREES
CALCULATED T AXIS, ECG11: 6 DEGREES
DIAGNOSIS, 93000: NORMAL
P-R INTERVAL, ECG05: 142 MS
Q-T INTERVAL, ECG07: 344 MS
QRS DURATION, ECG06: 72 MS
QTC CALCULATION (BEZET), ECG08: 434 MS
VENTRICULAR RATE, ECG03: 96 BPM

## 2024-09-14 ENCOUNTER — APPOINTMENT (OUTPATIENT)
Facility: HOSPITAL | Age: 39
End: 2024-09-14
Payer: MEDICAID

## 2024-09-14 ENCOUNTER — HOSPITAL ENCOUNTER (EMERGENCY)
Facility: HOSPITAL | Age: 39
Discharge: HOME | End: 2024-09-14
Attending: STUDENT IN AN ORGANIZED HEALTH CARE EDUCATION/TRAINING PROGRAM
Payer: MEDICAID

## 2024-09-14 VITALS
HEART RATE: 81 BPM | SYSTOLIC BLOOD PRESSURE: 127 MMHG | DIASTOLIC BLOOD PRESSURE: 87 MMHG | BODY MASS INDEX: 36.88 KG/M2 | TEMPERATURE: 98.2 F | OXYGEN SATURATION: 100 % | RESPIRATION RATE: 20 BRPM | HEIGHT: 67 IN | WEIGHT: 235 LBS

## 2024-09-14 DIAGNOSIS — O03.9 MISCARRIAGE: ICD-10-CM

## 2024-09-14 DIAGNOSIS — N93.9 VAGINAL BLEEDING: Primary | ICD-10-CM

## 2024-09-14 LAB
ABO + RH BLD: NORMAL
ANION GAP SERPL CALC-SCNC: 5 MMOL/L (ref 2–12)
APPEARANCE UR: ABNORMAL
BACTERIA URNS QL MICRO: NEGATIVE /HPF
BASOPHILS # BLD: 0.1 K/UL (ref 0–0.1)
BASOPHILS NFR BLD: 1 % (ref 0–1)
BILIRUB UR QL: NEGATIVE
BUN SERPL-MCNC: 11 MG/DL (ref 6–20)
BUN/CREAT SERPL: 15 (ref 12–20)
CA-I BLD-MCNC: 8.8 MG/DL (ref 8.5–10.1)
CHLORIDE SERPL-SCNC: 109 MMOL/L (ref 97–108)
CO2 SERPL-SCNC: 27 MMOL/L (ref 21–32)
COLOR UR: ABNORMAL
CREAT SERPL-MCNC: 0.72 MG/DL (ref 0.55–1.02)
DIFFERENTIAL METHOD BLD: ABNORMAL
EOSINOPHIL # BLD: 0.1 K/UL (ref 0–0.4)
EOSINOPHIL NFR BLD: 1 % (ref 0–7)
EPITH CASTS URNS QL MICRO: ABNORMAL /LPF
ERYTHROCYTE [DISTWIDTH] IN BLOOD BY AUTOMATED COUNT: 13 % (ref 11.5–14.5)
GLUCOSE SERPL-MCNC: 100 MG/DL (ref 65–100)
GLUCOSE UR STRIP.AUTO-MCNC: NEGATIVE MG/DL
HCG SERPL-ACNC: 6 MIU/ML (ref 0–6)
HCT VFR BLD AUTO: 38.8 % (ref 35–47)
HGB BLD-MCNC: 12.5 G/DL (ref 11.5–16)
HGB UR QL STRIP: ABNORMAL
IMM GRANULOCYTES # BLD AUTO: 0 K/UL (ref 0–0.04)
IMM GRANULOCYTES NFR BLD AUTO: 0 % (ref 0–0.5)
KETONES UR QL STRIP.AUTO: NEGATIVE MG/DL
LEUKOCYTE ESTERASE UR QL STRIP.AUTO: ABNORMAL
LYMPHOCYTES # BLD: 2.3 K/UL (ref 0.8–3.5)
LYMPHOCYTES NFR BLD: 21 % (ref 12–49)
MCH RBC QN AUTO: 29.3 PG (ref 26–34)
MCHC RBC AUTO-ENTMCNC: 32.2 G/DL (ref 30–36.5)
MCV RBC AUTO: 91.1 FL (ref 80–99)
MONOCYTES # BLD: 0.5 K/UL (ref 0–1)
MONOCYTES NFR BLD: 5 % (ref 5–13)
MUCOUS THREADS URNS QL MICRO: ABNORMAL /LPF
NEUTS SEG # BLD: 8.1 K/UL (ref 1.8–8)
NEUTS SEG NFR BLD: 72 % (ref 32–75)
NITRITE UR QL STRIP.AUTO: NEGATIVE
NRBC # BLD: 0 K/UL (ref 0–0.01)
NRBC BLD-RTO: 0 PER 100 WBC
PH UR STRIP: 6 (ref 5–8)
PLATELET # BLD AUTO: 330 K/UL (ref 150–400)
PMV BLD AUTO: 9.5 FL (ref 8.9–12.9)
POTASSIUM SERPL-SCNC: 4 MMOL/L (ref 3.5–5.1)
PROT UR STRIP-MCNC: NEGATIVE MG/DL
RBC # BLD AUTO: 4.26 M/UL (ref 3.8–5.2)
RBC #/AREA URNS HPF: >100 /HPF (ref 0–5)
SODIUM SERPL-SCNC: 141 MMOL/L (ref 136–145)
SP GR UR REFRACTOMETRY: 1.02 (ref 1–1.03)
UROBILINOGEN UR QL STRIP.AUTO: 0.1 EU/DL (ref 0.1–1)
WBC # BLD AUTO: 11.1 K/UL (ref 3.6–11)
WBC URNS QL MICRO: ABNORMAL /HPF (ref 0–4)

## 2024-09-14 PROCEDURE — 76817 TRANSVAGINAL US OBSTETRIC: CPT

## 2024-09-14 PROCEDURE — 86900 BLOOD TYPING SEROLOGIC ABO: CPT

## 2024-09-14 PROCEDURE — 84702 CHORIONIC GONADOTROPIN TEST: CPT

## 2024-09-14 PROCEDURE — 80048 BASIC METABOLIC PNL TOTAL CA: CPT

## 2024-09-14 PROCEDURE — 81001 URINALYSIS AUTO W/SCOPE: CPT

## 2024-09-14 PROCEDURE — 76801 OB US < 14 WKS SINGLE FETUS: CPT

## 2024-09-14 PROCEDURE — 76770 US EXAM ABDO BACK WALL COMP: CPT

## 2024-09-14 PROCEDURE — 85025 COMPLETE CBC W/AUTO DIFF WBC: CPT

## 2024-09-14 PROCEDURE — 87086 URINE CULTURE/COLONY COUNT: CPT

## 2024-09-14 PROCEDURE — 99284 EMERGENCY DEPT VISIT MOD MDM: CPT

## 2024-09-14 PROCEDURE — 36415 COLL VENOUS BLD VENIPUNCTURE: CPT

## 2024-09-14 PROCEDURE — 86901 BLOOD TYPING SEROLOGIC RH(D): CPT

## 2024-09-14 ASSESSMENT — LIFESTYLE VARIABLES
HOW OFTEN DO YOU HAVE A DRINK CONTAINING ALCOHOL: NEVER
HOW MANY STANDARD DRINKS CONTAINING ALCOHOL DO YOU HAVE ON A TYPICAL DAY: PATIENT DOES NOT DRINK

## 2024-09-14 ASSESSMENT — PAIN SCALES - GENERAL
PAINLEVEL_OUTOF10: 6
PAINLEVEL_OUTOF10: 4

## 2024-09-14 ASSESSMENT — PAIN - FUNCTIONAL ASSESSMENT
PAIN_FUNCTIONAL_ASSESSMENT: 0-10
PAIN_FUNCTIONAL_ASSESSMENT: 0-10

## 2024-09-16 LAB
BACTERIA SPEC CULT: NORMAL
COLONY COUNT, CNT: NORMAL
COLONY COUNT, CNT: NORMAL
Lab: NORMAL